# Patient Record
Sex: MALE | Race: WHITE | Employment: FULL TIME | URBAN - METROPOLITAN AREA
[De-identification: names, ages, dates, MRNs, and addresses within clinical notes are randomized per-mention and may not be internally consistent; named-entity substitution may affect disease eponyms.]

---

## 2017-01-11 ENCOUNTER — OFFICE VISIT (OUTPATIENT)
Dept: FAMILY MEDICINE | Facility: CLINIC | Age: 37
End: 2017-01-11
Payer: COMMERCIAL

## 2017-01-11 VITALS
DIASTOLIC BLOOD PRESSURE: 70 MMHG | SYSTOLIC BLOOD PRESSURE: 120 MMHG | WEIGHT: 218.1 LBS | TEMPERATURE: 98.4 F | HEIGHT: 74 IN | BODY MASS INDEX: 27.99 KG/M2 | OXYGEN SATURATION: 98 % | HEART RATE: 98 BPM

## 2017-01-11 DIAGNOSIS — L30.9 ECZEMA, UNSPECIFIED TYPE: Primary | ICD-10-CM

## 2017-01-11 PROCEDURE — 99213 OFFICE O/P EST LOW 20 MIN: CPT | Performed by: NURSE PRACTITIONER

## 2017-01-11 NOTE — PROGRESS NOTES
"  SUBJECTIVE:                                                    Kvng Peace is a 36 year old male who presents to clinic today for the following health issues:      Rash      Duration: 6 months+    Description  Location: hands and elbows, neck wrist left calf  Itching: moderate    Intensity:  moderate    Accompanying signs and symptoms: joint pain in wrists    History (similar episodes/previous evaluation): None    Precipitating or alleviating factors:  New exposures:  None  Recent travel: no      Therapies tried and outcome: topical steroid - kenalog    Chucky is here with complaints of an ongoing erythematous rash on his wrists, elbows,, knees and lower legs. He has been given triamcinolone cream in the past which was not helpful. Patient has patches of shiny red plaque most noticeable on both elbows. Patient is also having bilateral wrist pain and is concerned about psoriatic arthritis. He has seen several specialists with no improvement in the rash        Problem list and histories reviewed & adjusted, as indicated.  Additional history: none    Problem list, Medication list, Allergies, and Medical/Social/Surgical histories reviewed in EPIC and updated as appropriate.    ROS:  Constitutional, HEENT, cardiovascular, pulmonary, gi and gu systems are negative, except as otherwise noted.    OBJECTIVE:                                                    /70 mmHg  Pulse 98  Temp(Src) 98.4  F (36.9  C) (Oral)  Ht 6' 2\" (1.88 m)  Wt 218 lb 1.6 oz (98.93 kg)  BMI 27.99 kg/m2  SpO2 98%  Body mass index is 27.99 kg/(m^2).  GENERAL: healthy, alert and no distress  SKIN: erythematous rash on left lower leg, right wrist and hand and bilateral elbows with shiny placques present. Pruritic. Not responding to topical steroids.          ASSESSMENT/PLAN:                                                            1. Eczema, unspecified type  Will refer to dermatology at this point .since topical creams have not been " effective. Encourage patient to keep moisturizing the area with unscented creams. I have explained that dermatology will make all future recommendations.  - DERMATOLOGY REFERRAL    Follow up as needed.     Emma Stuart NP  [facility]

## 2017-01-11 NOTE — NURSING NOTE
"Chief Complaint   Patient presents with     Derm Problem       Initial /70 mmHg  Pulse 98  Temp(Src) 98.4  F (36.9  C) (Oral)  Ht 6' 2\" (1.88 m)  Wt 218 lb 1.6 oz (98.93 kg)  BMI 27.99 kg/m2  SpO2 98% Estimated body mass index is 27.99 kg/(m^2) as calculated from the following:    Height as of this encounter: 6' 2\" (1.88 m).    Weight as of this encounter: 218 lb 1.6 oz (98.93 kg).  BP completed using cuff size: large right arm   MINERVA Kat      "

## 2017-01-11 NOTE — MR AVS SNAPSHOT
After Visit Summary   1/11/2017    Kvng Peace    MRN: [id number]           Patient Information     Date Of Birth          1980        Visit Information        Provider Department      1/11/2017 9:00 AM Emma Stuart NP [facility]        Today's Diagnoses     Eczema, unspecified type    -  1        Follow-ups after your visit        Additional Services     DERMATOLOGY REFERRAL       [place] [facility] [facility] [place] [phone]   [email or web address]    Please be aware that coverage of these services is subject to the terms and limitations of your health insurance plan.  Call member services at your health plan with any benefit or coverage questions.      Please bring the following with you to your appointment:    (1) Any X-Rays, CTs or MRIs which have been performed.  Contact the facility where they were done to arrange for  prior to your scheduled appointment.    (2) List of current medications  (3) This referral request   (4) Any documents/labs given to you for this referral                  Who to contact     If you have questions or need follow up information about today's clinic visit or your schedule please contact [facility] directly at [phone].  Normal or non-critical lab and imaging results will be communicated to you by MyChart, letter or phone within 4 business days after the clinic has received the results. If you do not hear from us within 7 days, please contact the clinic through MyChart or phone. If you have a critical or abnormal lab result, we will notify you by phone as soon as possible.  Submit refill requests through Mumart or call your pharmacy and they will forward the refill request to us. Please allow 3 business days for your refill to be completed.          Additional Information About Your Visit        MyChart Information     MyChart  "lets you send messages to your doctor, view your test results, renew your prescriptions, schedule appointments and more. To sign up, go to [email or web address] . Click on \"Log in\" on the left side of the screen, which will take you to the Welcome page. Then click on \"Sign up Now\" on the right side of the page.     You will be asked to enter the access code listed below, as well as some personal information. Please follow the directions to create your username and password.     Your access code is: [email or web address]  Expires: 2017  9:28 AM     Your access code will  in 90 days. If you need help or a new code, please call your [facility] clinic or [phone].        Care EveryWhere ID     This is your Care EveryWhere ID. This could be used by other organizations to access your [facility] medical records  [id number]        Your Vitals Were     Pulse Temperature Height BMI (Body Mass Index) Pulse Oximetry       98 98.4  F (36.9  C) (Oral) 6' 2\" (1.88 m) 27.99 kg/m2 98%        Blood Pressure from Last 3 Encounters:   17 120/70   16 126/84   14 122/78    Weight from Last 3 Encounters:   17 218 lb 1.6 oz (98.93 kg)   16 213 lb (96.616 kg)   14 216 lb (97.977 kg)              We Performed the Following     DERMATOLOGY REFERRAL        Primary Care Provider    None Specified       No primary provider on file.        Thank you!     Thank you for choosing [facility]  for your care. Our goal is always to provide you with excellent care. Hearing back from our patients is one way we can continue to improve our services. Please take a few minutes to complete the written survey that you may receive in the mail after your visit with us. Thank you!             Your Updated Medication List - Protect others around you: Learn how to safely use, store and throw away your medicines at www.disposemymeds.org.      Notice  As of 2017  9:28 AM    You have not been prescribed any " medications.

## 2017-01-18 ENCOUNTER — OFFICE VISIT (OUTPATIENT)
Dept: FAMILY MEDICINE | Facility: CLINIC | Age: 37
End: 2017-01-18
Payer: COMMERCIAL

## 2017-01-18 DIAGNOSIS — L40.9 SCALP PSORIASIS: ICD-10-CM

## 2017-01-18 DIAGNOSIS — L40.0 PLAQUE PSORIASIS: Primary | ICD-10-CM

## 2017-01-18 PROCEDURE — 99214 OFFICE O/P EST MOD 30 MIN: CPT | Performed by: FAMILY MEDICINE

## 2017-01-18 RX ORDER — BETAMETHASONE DIPROPIONATE 0.05 %
OINTMENT (GRAM) TOPICAL 2 TIMES DAILY
Qty: 45 G | Refills: 0 | Status: CANCELLED | OUTPATIENT
Start: 2017-01-18

## 2017-01-18 RX ORDER — DESONIDE 0.5 MG/G
CREAM TOPICAL
Qty: 15 G | Refills: 0 | Status: SHIPPED | OUTPATIENT
Start: 2017-01-18 | End: 2018-04-19

## 2017-01-18 RX ORDER — FLUOCINONIDE TOPICAL SOLUTION USP, 0.05% 0.5 MG/ML
SOLUTION TOPICAL DAILY
Qty: 60 ML | Refills: 3 | Status: SHIPPED | OUTPATIENT
Start: 2017-01-18 | End: 2018-04-19

## 2017-01-18 RX ORDER — BETAMETHASONE DIPROPIONATE 0.5 MG/G
CREAM TOPICAL 2 TIMES DAILY
Qty: 45 G | Refills: 0 | Status: SHIPPED | OUTPATIENT
Start: 2017-01-18 | End: 2017-03-01

## 2017-01-18 NOTE — PATIENT INSTRUCTIONS
"FUTURE APPOINTMENTS  Follow up in 4-6 weeks.  Follow up with your primary care physician regarding joint pain in the wrists.    Please label the steroid tubes with the locations of application.  Consider comparing prices on SWYF or m-spatial     TOPICAL STEROID INSTRUCTIONS - arms, hands, and legs  Betamethasone dipropionate 0.05% cream.    Apply an amount equal to half of a fingertip (0.25 g) to the affected area(s) on the arms, legs and hands, two times per day for 10-14 days.    \"Fingertip Amount\"      Not to be used on face or groin.    Not to be used consecutively for more than 10-14 days.    Topical steroid use is for short-term treatment only. Although you can use this as needed for flare-ups, if after initial treatment, you are using this for prolonged periods of time (i.e. every day of the week), re-check for evaluation of treatment.    Keep in mind to also regularly use moisturizer, as this preventative measure can help maintain your skin's natural moisture barrier.    Wear white, cotton gloves (can purchase at any retail pharmacy) after application of moisturizer and topical steroid nightly at bedtime and wear until the morning.    TOPICAL STEROID INSTRUCTIONS - scalp  Fluocinonide 0.05% solution.    Apply a few drops and rub into affected areas on scalp, at bedtime for 7-10 days.    Not to be used on face or groin.    Not to be used consecutively for more than 7-10 days.    Topical steroid use is for short-term treatment only. Although you can use this as needed for flare-ups, if after initial treatment, you are using this for prolonged periods of time (i.e. every day of the week), re-check for evaluation of treatment.    TOPICAL STEROID INSTRUCTIONS - ears and belly button  Desonide 0.05% cream.    Apply an amount equal to half of a fingertip (0.25 g) to the affected area(s) on the ears and belly button, two times per day for 5-7 days.    \"Fingertip Amount\"      This is a weak strength " steroid, and it can be used on the ears.    Not to be used consecutively for more than 5-7 days.    Topical steroid use is for short-term treatment only. Although you can use this as needed for flare-ups, if after initial treatment, you are using this for prolonged periods of time (i.e. every day of the week), re-check for evaluation of treatment.    Keep in mind to also regularly use moisturizer, as this preventative measure can help maintain your skin's natural moisture barrier.    SHAMPOO INSTRUCTIONS  Consider alternating use of shampoos with different active ingredients every 4 week(s).   Coal Tar shampoos : Neutrogena T/Gel, Denorex, DHS Tar, Ionil-T, Tegrin, X-Seb T, Zetar  Zinc Pyrithione shampoos : Head & Shoulders, Denorex Advance Formula, DHS Zinc, Zincon  Salicylic Acid shampoos : Neutrogena T/Sal, DHS Sal, Ionil, P&S, Sebulex, X-Seb  Selenium Sulfide shampoos : Selsun Blue shampoo  Sulfur shampoos : Sebulex    DRY SKIN INSTRUCTIONS  Routine use of moisturizer is important for healthy, resilient skin.    Twice daily use of a moisturizer such as over-the-counter (OTC) CeraVe moisturizer cream (in the jar) or OTC Cetaphil RestoraDerm moisturizer. These contain ceramides and filaggrin proteins that can help to maintain the body's moisture layer.    Always apply moisturizer after washing, within 3 minutes of drying off for best effect.    Do not overuse soap. Just apply soap on the groin and armpits, unless you have been sweating extensively. Recommended products include OTC unscented Dove sensitive skin or OTC Vanicream cleansing bar.    Good facial cleansers include OTC CeraVe hydrating facial cleanser or OTC Cetaphil daily facial cleanser.    Avoid use of scented products and/or antistatic dryer sheets.    SUPPLEMENTS  Take by mouth a supplement of Vitamin D3 1000 IU/day.  Avoid excessive tanning, but a small amount may be helpful for psoriasis symptoms.

## 2017-01-18 NOTE — MR AVS SNAPSHOT
"              After Visit Summary   1/18/2017    Kvng Peace    MRN: [id number]           Patient Information     Date Of Birth          1980        Visit Information        Provider Department      1/18/2017 12:20 PM Debby Salaazr MD [facility]        Today's Diagnoses     Plaque psoriasis    -  1     Scalp psoriasis           Care Instructions    FUTURE APPOINTMENTS  Follow up in 4-6 weeks.  Follow up with your primary care physician regarding joint pain in the wrists.    Please label the steroid tubes with the locations of application.  Consider comparing prices on my[employer] or [employer]     TOPICAL STEROID INSTRUCTIONS - arms, hands, and legs  Betamethasone dipropionate 0.05% cream.    Apply an amount equal to half of a fingertip (0.25 g) to the affected area(s) on the arms, legs and hands, two times per day for 10-14 days.    \"Fingertip Amount\"      Not to be used on face or groin.    Not to be used consecutively for more than 10-14 days.    Topical steroid use is for short-term treatment only. Although you can use this as needed for flare-ups, if after initial treatment, you are using this for prolonged periods of time (i.e. every day of the week), re-check for evaluation of treatment.    Keep in mind to also regularly use moisturizer, as this preventative measure can help maintain your skin's natural moisture barrier.    Wear white, cotton gloves (can purchase at any retail pharmacy) after application of moisturizer and topical steroid nightly at bedtime and wear until the morning.    TOPICAL STEROID INSTRUCTIONS - scalp  Fluocinonide 0.05% solution.    Apply a few drops and rub into affected areas on scalp, at bedtime for 7-10 days.    Not to be used on face or groin.    Not to be used consecutively for more than 7-10 days.    Topical steroid use is for short-term treatment only. Although you can use this as needed for flare-ups, if after initial " "treatment, you are using this for prolonged periods of time (i.e. every day of the week), re-check for evaluation of treatment.    TOPICAL STEROID INSTRUCTIONS - ears  Desonide 0.05% cream.    Apply an amount equal to half of a fingertip (0.25 g) to the affected area(s) on the ears, two times per day for 5-7 days.    \"Fingertip Amount\"      This is a weak strength steroid, and it can be used on the ears.    Not to be used consecutively for more than 5-7 days.    Topical steroid use is for short-term treatment only. Although you can use this as needed for flare-ups, if after initial treatment, you are using this for prolonged periods of time (i.e. every day of the week), re-check for evaluation of treatment.    Keep in mind to also regularly use moisturizer, as this preventative measure can help maintain your skin's natural moisture barrier.    SHAMPOO INSTRUCTIONS  Consider alternating use of shampoos with different active ingredients every 4 week(s).   Coal Tar shampoos : Neutrogena T/Gel, Denorex, DHS Tar, Ionil-T, Tegrin, X-Seb T, Zetar  Zinc Pyrithione shampoos : Head & Shoulders, Denorex Advance Formula, DHS Zinc, Zincon  Salicylic Acid shampoos : Neutrogena T/Sal, DHS Sal, Ionil, P&S, Sebulex, X-Seb  Selenium Sulfide shampoos : Selsun Blue shampoo  Sulfur shampoos : Sebulex    DRY SKIN INSTRUCTIONS  Routine use of moisturizer is important for healthy, resilient skin.    Twice daily use of a moisturizer such as over-the-counter (OTC) CeraVe moisturizer cream (in the jar) or OTC Cetaphil RestoraDerm moisturizer. These contain ceramides and filaggrin proteins that can help to maintain the body's moisture layer.    Always apply moisturizer after washing, within 3 minutes of drying off for best effect.    Do not overuse soap. Just apply soap on the groin and armpits, unless you have been sweating extensively. Recommended products include OTC unscented Dove sensitive skin or OTC Vanicream cleansing bar.    Good " "facial cleansers include OTC CeraVe hydrating facial cleanser or OTC Cetaphil daily facial cleanser.    Avoid use of scented products and/or antistatic dryer sheets.    SUPPLEMENTS  Take by mouth a supplement of Vitamin D3 1000 IU/day.  Avoid excessive tanning, but a small amount may be helpful for psoriasis symptoms.        Follow-ups after your visit        Who to contact     If you have questions or need follow up information about today's clinic visit or your schedule please contact [facility] directly at [phone].  Normal or non-critical lab and imaging results will be communicated to you by HuoBihart, letter or phone within 4 business days after the clinic has received the results. If you do not hear from us within 7 days, please contact the clinic through [email or web address] or phone. If you have a critical or abnormal lab result, we will notify you by phone as soon as possible.  Submit refill requests through Gaatu or call your pharmacy and they will forward the refill request to us. Please allow 3 business days for your refill to be completed.          Additional Information About Your Visit        HuoBiharSharewire Information     Gaatu lets you send messages to your doctor, view your test results, renew your prescriptions, schedule appointments and more. To sign up, go to [email or web address] . Click on \"Log in\" on the left side of the screen, which will take you to the Welcome page. Then click on \"Sign up Now\" on the right side of the page.     You will be asked to enter the access code listed below, as well as some personal information. Please follow the directions to create your username and password.     Your access code is: [email or web address]  Expires: 2017  9:28 AM     Your access code will  in 90 days. If you need help or a new code, please call your [facility] or [phone].        Care EveryWhere ID     This is your Care EveryWhere ID. This could be used by other " organizations to access your [facility] medical records  [id number]         Blood Pressure from Last 3 Encounters:   01/11/17 120/70   04/11/16 126/84   12/23/14 122/78    Weight from Last 3 Encounters:   01/11/17 218 lb 1.6 oz (98.93 kg)   04/11/16 213 lb (96.616 kg)   12/23/14 216 lb (97.977 kg)              Today, you had the following     No orders found for display       Primary Care Provider    None Specified       No primary provider on file.        Thank you!     Thank you for choosing [facility]  for your care. Our goal is always to provide you with excellent care. Hearing back from our patients is one way we can continue to improve our services. Please take a few minutes to complete the written survey that you may receive in the mail after your visit with us. Thank you!             Your Updated Medication List - Protect others around you: Learn how to safely use, store and throw away your medicines at www.disposemymeds.org.      Notice  As of 1/18/2017 12:24 PM    You have not been prescribed any medications.

## 2017-01-18 NOTE — PROGRESS NOTES
[facility] - PRIMARY CARE SKIN    CC : Rash   SUBJECTIVE:                                                    Kvng Peace is a 36 year old male who presents to clinic today because of a(n) itchy rash beginning 1-2 years ago on the hands, elbows, left lower leg. A flare-up on the right leg was noted in April 2016 while in [place], alleviated with use a prescription topical medication . He has also noticed intense itchiness and dandruff on the scalp (first beginning at least 2 years ago); itchiness on the neck was the first noticeable symptom. A rash  is also noted on the belly button.    A pain in the left wrist has been chronic over the past 5 years; pain on the right wrist is new in the past year. He is concerned about psoriasis arthritis. He also golfs frequently (up to a couple times a week). No other joint pains reported.     Pruritic : extremely itchy. Itchiness worst in the morning.  Symptoms appear to be : worsening.  Aggravating factors : heat, shower.  Relieving factors : moisturizer.    Previous history of a similar rash : YES.  Recent exposure history : none known     Therapies tried for rash : Rx topical antibiotic and topical steroid.  Products used : Aveeno, Jergens lotions.    Personal Medical History  Skin Cancer : NO  Eczema Psoriasis Rosacea Autoimmune Other   ?YES ?YES NO NO NO     Family Medical History  Skin Cancer : NO  Eczema Psoriasis Rosacea Autoimmune Other   NO NO NO NO NO     Occupation :  (indoor).    Patient Active Problem List   Diagnosis     CARDIOVASCULAR SCREENING; LDL GOAL LESS THAN 160       Past Medical History   Diagnosis Date     CARDIOVASCULAR SCREENING; LDL GOAL LESS THAN 160 12/23/2014    Past Surgical History   Procedure Laterality Date     [place] teeth        Social History   Substance Use Topics     Smoking status: Former Smoker -- 1.00 packs/day for 15 years     Types: Cigarettes     Smokeless tobacco: Never Used     Alcohol Use: 0.0 oz/week     0  Standard drinks or equivalent per week      Comment: socially    Family History     Problem (# of Occurrences) Relation (Name,Age of Onset)    CANCER (2) Father: kidney: 64: , Maternal Grandfather: lung    DIABETES (3) Mother: T2, Paternal Grandmother, Paternal Grandfather           Prescription Medications as of 2017             betamethasone dipropionate (DIPROSONE) 0.05 % cream Apply topically 2 times daily to affected areas on arms, hands, legs for 10-14 consecutive days, Not to be used on face or groin    fluocinonide (LIDEX) 0.05 % solution Apply topically daily    desonide (DESOWEN) 0.05 % cream Apply sparingly to affected area on ears bid when needed          No Known Allergies     INTEGUMENTARY/SKIN: POSITIVE for pruritis and rash  MUSCULOSKELETAL: POSITIVE for joint aches.  ROS : 14 point review of systems was negative except the symptoms listed above in the HPI.    This document serves as a record of the services and decisions personally performed and made by Jennifer Salazar MD. It was created on her behalf by Dexter Mccall, a trained medical scribe.  The creation of this document is based on the scribe's personal observations and the provider's statements to the medical scribe.  Dexter Mccall, 2017 12:06 PM      OBJECTIVE:                                                    GENERAL: healthy, alert and no distress  SKIN: Matos Skin Type - II.  Face, Neck, Arms, Legs Hands and Fingers were examined. The dermatoscope was used to help evaluate pigmented lesions.  Skin Pertinent Findings:  Left lateral lower leg : 6 cm x 4 cm in size, erythematous, lightly scaly plaque.    Elbows : Erythematous, lightly scaly plaques.    Right Hand : Scaling and erythema on the thenar eminence. 1 cm scaly erythematous plaque between right thumb and index finger.    Left hand : Small erythematous scaly patch on thenar eminence.    Occipital scalp : Dull erythematous scaly patch.    Left ear : Erythematous  "scaly patch     Umbilicus : Erythematous patch.    Back, chest : Clear.    Knees : Clear.    Diagnostic Test Results:  none     MDM : the appearance and distribution of the rash is consistent with psoriasis. At this time I don't think is wrist symptoms are related to psoriasis arthritis but did recommend further evaluation of these symptoms with his primary provider. The wrist symptoms are usually in the summer when he plays golf twice per week.    Discussed spectrum of psoriasis, treatment options etc.  ASSESSMENT:                                                      Encounter Diagnoses   Name Primary?     Plaque psoriasis Yes     Scalp psoriasis          PLAN:                                                    Patient Instructions   FUTURE APPOINTMENTS  Follow up in 4-6 weeks.  Follow up with your primary care physician regarding joint pain in the wrists.    Please label the steroid tubes with the locations of application.  Consider comparing prices on [employer] or [employer]     TOPICAL STEROID INSTRUCTIONS - arms, hands, and legs  Betamethasone dipropionate 0.05% cream.    Apply an amount equal to half of a fingertip (0.25 g) to the affected area(s) on the arms, legs and hands, two times per day for 10-14 days.    \"Fingertip Amount\"      Not to be used on face or groin.    Not to be used consecutively for more than 10-14 days.    Topical steroid use is for short-term treatment only. Although you can use this as needed for flare-ups, if after initial treatment, you are using this for prolonged periods of time (i.e. every day of the week), re-check for evaluation of treatment.    Keep in mind to also regularly use moisturizer, as this preventative measure can help maintain your skin's natural moisture barrier.    Wear white, cotton gloves (can purchase at any retail pharmacy) after application of moisturizer and topical steroid nightly at bedtime and wear until the morning.    TOPICAL STEROID " "INSTRUCTIONS - scalp  Fluocinonide 0.05% solution.    Apply a few drops and rub into affected areas on scalp, at bedtime for 7-10 days.    Not to be used on face or groin.    Not to be used consecutively for more than 7-10 days.    Topical steroid use is for short-term treatment only. Although you can use this as needed for flare-ups, if after initial treatment, you are using this for prolonged periods of time (i.e. every day of the week), re-check for evaluation of treatment.    TOPICAL STEROID INSTRUCTIONS - ears and belly button  Desonide 0.05% cream.    Apply an amount equal to half of a fingertip (0.25 g) to the affected area(s) on the ears and belly button, two times per day for 5-7 days.    \"Fingertip Amount\"      This is a weak strength steroid, and it can be used on the ears.    Not to be used consecutively for more than 5-7 days.    Topical steroid use is for short-term treatment only. Although you can use this as needed for flare-ups, if after initial treatment, you are using this for prolonged periods of time (i.e. every day of the week), re-check for evaluation of treatment.    Keep in mind to also regularly use moisturizer, as this preventative measure can help maintain your skin's natural moisture barrier.    SHAMPOO INSTRUCTIONS  Consider alternating use of shampoos with different active ingredients every 4 week(s).   Coal Tar shampoos : Neutrogena T/Gel, Denorex, DHS Tar, Ionil-T, Tegrin, X-Seb T, Zetar  Zinc Pyrithione shampoos : Head & Shoulders, Denorex Advance Formula, DHS Zinc, Zincon  Salicylic Acid shampoos : Neutrogena T/Sal, DHS Sal, Ionil, P&S, Sebulex, X-Seb  Selenium Sulfide shampoos : Selsun Blue shampoo  Sulfur shampoos : Sebulex    DRY SKIN INSTRUCTIONS  Routine use of moisturizer is important for healthy, resilient skin.    Twice daily use of a moisturizer such as over-the-counter (OTC) CeraVe moisturizer cream (in the jar) or OTC Cetaphil RestoraDerm moisturizer. These contain " ceramides and filaggrin proteins that can help to maintain the body's moisture layer.    Always apply moisturizer after washing, within 3 minutes of drying off for best effect.    Do not overuse soap. Just apply soap on the groin and armpits, unless you have been sweating extensively. Recommended products include OTC unscented Dove sensitive skin or OTC Vanicream cleansing bar.    Good facial cleansers include OTC CeraVe hydrating facial cleanser or OTC Cetaphil daily facial cleanser.    Avoid use of scented products and/or antistatic dryer sheets.    SUPPLEMENTS  Take by mouth a supplement of Vitamin D3 1000 IU/day.  Avoid excessive tanning, but a small amount may be helpful for psoriasis symptoms.        The patient was counseled to use products free of fragrance, dyes, and plants. The importance of using bland cleansers and the regular use of heavy bland emollient creams was impressed upon the patient.      PROCEDURES:                                                    None.    TT : 25 minutes.  CT : 15 minutes.      The information in this document, created by the medical scribe for me, accurately reflects the services I personally performed and the decisions made by me. I have reviewed and approved this document for accuracy prior to leaving the patient care area.  Jennifer Salazar MD January 18, 2017 12:06 PM  [facility] - PRIMARY CARE SKIN

## 2017-03-01 ENCOUNTER — OFFICE VISIT (OUTPATIENT)
Dept: FAMILY MEDICINE | Facility: CLINIC | Age: 37
End: 2017-03-01
Payer: COMMERCIAL

## 2017-03-01 DIAGNOSIS — L40.0 PLAQUE PSORIASIS: Primary | ICD-10-CM

## 2017-03-01 DIAGNOSIS — L40.9 SCALP PSORIASIS: ICD-10-CM

## 2017-03-01 PROCEDURE — 99213 OFFICE O/P EST LOW 20 MIN: CPT | Performed by: FAMILY MEDICINE

## 2017-03-01 RX ORDER — BETAMETHASONE DIPROPIONATE 0.5 MG/G
CREAM TOPICAL
Qty: 45 G | Refills: 0 | Status: SHIPPED | OUTPATIENT
Start: 2017-03-01 | End: 2018-04-19

## 2017-03-01 NOTE — PROGRESS NOTES
[facility] - PRIMARY CARE SKIN    CC : psoriasis  SUBJECTIVE:                                                    Kvng Peace is a 37 year old male who presents to clinic today for follow-up of plaque and scalp psoriasis, beginning 1-2 years ago. Areas of involvement include the scalp, ears, arms, hands, legs, and umbilicus. He had also complained of wrist joint pain, but suspected due to golf.     Itchiness has recurred on the legs two days ago but without presence of visible rash. Itchiness has been diminishing within the last two days.    Current treatment : Lubriderm moisturizer use, Vitamin D supplement not begun.    Scalp : fluocinonide 0.05% solution, anti-dandruff shampoos.    Arms, Hands and Legs : Betamethasone dipropionate 0.05% cream    Ears and umbilicus : desonide 0.05% cream  Response to treatment : Symptoms have begun to resolve. Itchiness diminished greatly. Area on right palm is most bothersome with itchiness in the mornings. He discontinued treatments when he traveled to [place] for 1 week.    For complete history, please review office visit notes from 1/18/2017    Personal Medical History  Skin Cancer : NO  Eczema Psoriasis Rosacea Autoimmune Other   NNO YES NO NO NO     Family Medical History  Skin Cancer : NO  Eczema Psoriasis Rosacea Autoimmune Other   NO NO NO NO NO     Occupation :  (indoor).    Patient Active Problem List   Diagnosis     CARDIOVASCULAR SCREENING; LDL GOAL LESS THAN 160       Past Medical History   Diagnosis Date     CARDIOVASCULAR SCREENING; LDL GOAL LESS THAN 160 12/23/2014    Past Surgical History   Procedure Laterality Date     [place] teeth        Social History   Substance Use Topics     Smoking status: Former Smoker     Packs/day: 1.00     Years: 15.00     Types: Cigarettes     Smokeless tobacco: Never Used     Alcohol use 0.0 oz/week     0 Standard drinks or equivalent per week      Comment: socially    Family History     Problem (# of  Occurrences) Relation (Name,Age of Onset)    CANCER (2) Father: kidney: 64: , Maternal Grandfather: lung    DIABETES (3) Mother: T2, Paternal Grandmother, Paternal Grandfather           Prescription Medications as of 3/1/2017             betamethasone dipropionate (DIPROSONE) 0.05 % cream Apply topically 2 times daily to affected areas on arms, hands, legs for 10-14 consecutive days, Not to be used on face or groin    fluocinonide (LIDEX) 0.05 % solution Apply topically daily    desonide (DESOWEN) 0.05 % cream Apply sparingly to affected area on ears bid when needed          No Known Allergies     INTEGUMENTARY/SKIN: POSITIVE for pruritis and rash  ROS : 14 point review of systems was negative except the symptoms listed above in the HPI.    This document serves as a record of the services and decisions personally performed and made by Jennifer Salazar MD. It was created on her behalf by Dexter Mccall, a trained medical scribe.  The creation of this document is based on the scribe's personal observations and the provider's statements to the medical scribe.  Dexter Mccall, 2017 12:11 PM      OBJECTIVE:                                                    GENERAL: healthy, alert and no distress  SKIN: Matos Skin Type - II.  Face, Neck, Arms, Legs Hands and Fingers were examined. The dermatoscope was used to help evaluate pigmented lesions.  Skin Pertinent Findings:  Right hand, palmar surface : Residual lightly scaling plaques over thenar eminence and mid-palm.    Left hand : clear    Occipital scalp : Clear.  Behind left ear : Clear.   Elbows : Clear.    Left lateral lower leg : 6 cm x 4 cm in size, erythematous, lightly scaly plaque.  Umbilicus : Erythematous patch.    Diagnostic Test Results:  none           ASSESSMENT:                                                      Encounter Diagnoses   Name Primary?     Plaque psoriasis Yes     Scalp psoriasis          PLAN:                                                     Patient Instructions   FUTURE APPOINTMENTS  Follow up as needed.    TOPICAL STEROID INSTRUCTIONS  Betamethasone dipropionate 0.05% cream.    Apply an amount equal to half of a fingertip (0.25 g) to the affected area(s) on the right hand, two times per day for 10-14 days.    After 2 weeks, decrease frequency of application to once daily on M-F, pause application on Sat-Sun.    Not to be used on face or groin.      Topical steroid use is for short-term treatment only. If after initial treatment, you are using this for prolonged periods of time, return to clinic for re-evaluation of treatment.    Keep in mind to also regularly use moisturizer, as this preventative measure can help maintain your skin's natural moisture barrier.    Wear white, cotton gloves (can purchase at any retail pharmacy) after application of moisturizer and topical steroid nightly at bedtime and wear until the morning.      Continue using other topical steroids as needed.    DRY SKIN INSTRUCTIONS  Routine use of moisturizer is important for healthy, resilient skin.    Twice daily use of a moisturizer such as over-the-counter (OTC) CeraVe moisturizer cream (in the jar) or OTC Cetaphil RestoraDerm moisturizer. These contain ceramides and filaggrin proteins that can help to maintain the body's moisture layer.    Always apply moisturizer after washing, within 3 minutes of drying off for best effect.    Do not overuse soap. Just apply soap on the groin and armpits, unless you have been sweating extensively. Recommended products include OTC unscented Dove sensitive skin or OTC Vanicream cleansing bar.    Good facial cleansers include OTC CeraVe hydrating facial cleanser or OTC Cetaphil daily facial cleanser.    Avoid use of scented products and/or antistatic dryer sheets.    Always try to wear rubber gloves when washing dishes or cleaning.        PROCEDURES:                                                    None.    TT : 20 minutes.  CT :  15 minutes.      The information in this document, created by the medical scribe for me, accurately reflects the services I personally performed and the decisions made by me. I have reviewed and approved this document for accuracy prior to leaving the patient care area.  Jennifer Salazar MD March 1, 2017 12:10 PM  [facility] - PRIMARY CARE SKIN

## 2017-03-01 NOTE — MR AVS SNAPSHOT
After Visit Summary   3/1/2017    Kvng Peace    MRN: [id number]           Patient Information     Date Of Birth          1980        Visit Information        Provider Department      3/1/2017 12:20 PM Debby Salazar MD [facility] - Primary Care Skin        Today's Diagnoses     Plaque psoriasis    -  1    Scalp psoriasis          Care Instructions    FUTURE APPOINTMENTS  Follow up as needed.    TOPICAL STEROID INSTRUCTIONS  Betamethasone dipropionate 0.05% cream.    Apply an amount equal to half of a fingertip (0.25 g) to the affected area(s) on the right hand, two times per day for 10-14 days.    After 2 weeks, decrease frequency of application to once daily on M-F, pause application on Sat-Sun.    Not to be used on face or groin.      Topical steroid use is for short-term treatment only. If after initial treatment, you are using this for prolonged periods of time, return to clinic for re-evaluation of treatment.    Keep in mind to also regularly use moisturizer, as this preventative measure can help maintain your skin's natural moisture barrier.    Wear white, cotton gloves (can purchase at any retail pharmacy) after application of moisturizer and topical steroid nightly at bedtime and wear until the morning.      Continue using other topical steroids as needed.    DRY SKIN INSTRUCTIONS  Routine use of moisturizer is important for healthy, resilient skin.    Twice daily use of a moisturizer such as over-the-counter (OTC) CeraVe moisturizer cream (in the jar) or OTC Cetaphil RestoraDerm moisturizer. These contain ceramides and filaggrin proteins that can help to maintain the body's moisture layer.    Always apply moisturizer after washing, within 3 minutes of drying off for best effect.    Do not overuse soap. Just apply soap on the groin and armpits, unless you have been sweating extensively. Recommended products include OTC unscented Dove sensitive skin or OTC Vanicream  "cleansing bar.    Good facial cleansers include OTC CeraVe hydrating facial cleanser or OTC Cetaphil daily facial cleanser.    Avoid use of scented products and/or antistatic dryer sheets.    Always try to wear rubber gloves when washing dishes or cleaning.        Follow-ups after your visit        Who to contact     If you have questions or need follow up information about today's clinic visit or your schedule please contact [facility] - PRIMARY CARE SKIN directly at [phone].  Normal or non-critical lab and imaging results will be communicated to you by [employer], letter or phone within 4 business days after the clinic has received the results. If you do not hear from us within 7 days, please contact the clinic through [employer] or phone. If you have a critical or abnormal lab result, we will notify you by phone as soon as possible.  Submit refill requests through [employer] or call your pharmacy and they will forward the refill request to us. Please allow 3 business days for your refill to be completed.          Additional Information About Your Visit        [employer] Information     [employer] lets you send messages to your doctor, view your test results, renew your prescriptions, schedule appointments and more. To sign up, go to [email or web address] . Click on \"Log in\" on the left side of the screen, which will take you to the Welcome page. Then click on \"Sign up Now\" on the right side of the page.     You will be asked to enter the access code listed below, as well as some personal information. Please follow the directions to create your username and password.     Your access code is: [email or web address]  Expires: 2017  9:28 AM     Your access code will  in 90 days. If you need help or a new code, please call your [facility] or [phone].        Care EveryWhere ID     This is your Care EveryWhere ID. This could be used by other organizations to access your [facility] medical records  [id number]   "       Blood Pressure from Last 3 Encounters:   01/11/17 120/70   04/11/16 126/84   12/23/14 122/78    Weight from Last 3 Encounters:   01/11/17 218 lb 1.6 oz (98.9 kg)   04/11/16 213 lb (96.6 kg)   12/23/14 216 lb (98 kg)              Today, you had the following     No orders found for display         Today's Medication Changes          These changes are accurate as of: 3/1/17 12:22 PM.  If you have any questions, ask your nurse or doctor.               These medicines have changed or have updated prescriptions.        Dose/Directions    betamethasone dipropionate 0.05 % cream   Commonly known as:  DIPROSONE   This may have changed:    - how to take this  - when to take this  - additional instructions   Used for:  Plaque psoriasis   Changed by:  Debby Salazar MD        Apply to affected areas on arms, hands, legs for 10-14 consecutive days, Not to be used on face or groin   Quantity:  45 g   Refills:  0            Where to get your medicines      These medications were sent to [id number]    Hours:  Tech issues with their phone system Phone:  [phone]     betamethasone dipropionate 0.05 % cream                Primary Care Provider    None Specified       No primary provider on file.        Thank you!     Thank you for choosing [facility] - PRIMARY CARE SKIN  for your care. Our goal is always to provide you with excellent care. Hearing back from our patients is one way we can continue to improve our services. Please take a few minutes to complete the written survey that you may receive in the mail after your visit with us. Thank you!             Your Updated Medication List - Protect others around you: Learn how to safely use, store and throw away your medicines at www.disposemymeds.org.          This list is accurate as of: 3/1/17 12:22 PM.  Always use your most recent med list.                   Brand  Name Dispense Instructions for use    betamethasone dipropionate 0.05 % cream    DIPROSONE    45 g    Apply to affected areas on arms, hands, legs for 10-14 consecutive days, Not to be used on face or groin       desonide 0.05 % cream    DESOWEN    15 g    Apply sparingly to affected area on ears bid when needed       fluocinonide 0.05 % solution    LIDEX    60 mL    Apply topically daily

## 2017-03-01 NOTE — PATIENT INSTRUCTIONS
FUTURE APPOINTMENTS  Follow up as needed.    TOPICAL STEROID INSTRUCTIONS  Betamethasone dipropionate 0.05% cream.    Apply an amount equal to half of a fingertip (0.25 g) to the affected area(s) on the right hand, two times per day for 10-14 days.    After 2 weeks, decrease frequency of application to once daily on M-F, pause application on Sat-Sun.    Not to be used on face or groin.      Topical steroid use is for short-term treatment only. If after initial treatment, you are using this for prolonged periods of time, return to clinic for re-evaluation of treatment.    Keep in mind to also regularly use moisturizer, as this preventative measure can help maintain your skin's natural moisture barrier.    Wear white, cotton gloves (can purchase at any retail pharmacy) after application of moisturizer and topical steroid nightly at bedtime and wear until the morning.      Continue using other topical steroids as needed.    DRY SKIN INSTRUCTIONS  Routine use of moisturizer is important for healthy, resilient skin.    Twice daily use of a moisturizer such as over-the-counter (OTC) CeraVe moisturizer cream (in the jar) or OTC Cetaphil RestoraDerm moisturizer. These contain ceramides and filaggrin proteins that can help to maintain the body's moisture layer.    Always apply moisturizer after washing, within 3 minutes of drying off for best effect.    Do not overuse soap. Just apply soap on the groin and armpits, unless you have been sweating extensively. Recommended products include OTC unscented Dove sensitive skin or OTC Vanicream cleansing bar.    Good facial cleansers include OTC CeraVe hydrating facial cleanser or OTC Cetaphil daily facial cleanser.    Avoid use of scented products and/or antistatic dryer sheets.    Always try to wear rubber gloves when washing dishes or cleaning.

## 2018-04-19 ENCOUNTER — TELEPHONE (OUTPATIENT)
Dept: FAMILY MEDICINE | Facility: CLINIC | Age: 38
End: 2018-04-19

## 2018-04-19 ENCOUNTER — OFFICE VISIT (OUTPATIENT)
Dept: FAMILY MEDICINE | Facility: CLINIC | Age: 38
End: 2018-04-19
Payer: COMMERCIAL

## 2018-04-19 VITALS
HEART RATE: 75 BPM | DIASTOLIC BLOOD PRESSURE: 71 MMHG | RESPIRATION RATE: 16 BRPM | BODY MASS INDEX: 30.42 KG/M2 | WEIGHT: 237 LBS | SYSTOLIC BLOOD PRESSURE: 122 MMHG | HEIGHT: 74 IN | TEMPERATURE: 99 F

## 2018-04-19 DIAGNOSIS — M10.072 ACUTE IDIOPATHIC GOUT OF LEFT FOOT: Primary | ICD-10-CM

## 2018-04-19 PROCEDURE — 99214 OFFICE O/P EST MOD 30 MIN: CPT | Performed by: FAMILY MEDICINE

## 2018-04-19 PROCEDURE — 84550 ASSAY OF BLOOD/URIC ACID: CPT | Performed by: FAMILY MEDICINE

## 2018-04-19 PROCEDURE — 36415 COLL VENOUS BLD VENIPUNCTURE: CPT | Performed by: FAMILY MEDICINE

## 2018-04-19 PROCEDURE — 80053 COMPREHEN METABOLIC PANEL: CPT | Performed by: FAMILY MEDICINE

## 2018-04-19 RX ORDER — OMEPRAZOLE 10 MG/1
CAPSULE, DELAYED RELEASE ORAL
Qty: 60 CAPSULE | COMMUNITY
Start: 2018-04-19 | End: 2022-06-17

## 2018-04-19 RX ORDER — PREDNISONE 20 MG/1
TABLET ORAL
Qty: 25 TABLET | Refills: 0 | Status: SHIPPED | OUTPATIENT
Start: 2018-04-19 | End: 2018-05-04

## 2018-04-19 NOTE — TELEPHONE ENCOUNTER
[facility] calling- Prednisone states 4 tabs (40 mg) for first part of directions.  Is it 2 tabs (40 mg) or 4 tabs (80 mg)?  Please advise.  Can send new RX or I can call them.  Rivka Seth RN    predniSONE (DELTASONE) 20 MG tablet 25 tablet 0 4/19/2018  --      Sig: Take 4 tabs (40 mg) by mouth daily x 4 days, 1 tabs (20 mg) daily x 4 days, then 1/2 tab (10 mg) x 2 days.

## 2018-04-19 NOTE — MR AVS SNAPSHOT
After Visit Summary   4/19/2018    Kvng Peace    MRN: [id number]           Patient Information     Date Of Birth          1980        Visit Information        Provider Department      4/19/2018 8:45 AM Kaylan Coats MD [facility]        Today's Diagnoses     Acute idiopathic gout of left foot    -  1      Care Instructions    Follow up if no improvement     Treating Gout Attacks     Raising the joint above the level of your heart can help reduce gout symptoms.     Gout is a disease that affects the joints. It is caused by excess uric acid in your blood stream that may lead to crystals forming in your joints. Left untreated, it can lead to painful foot and joint deformities and even kidney problems. But, by treating gout early, you can relieve pain and help prevent future problems. Gout can usually be treated with medication and proper diet. In severe cases, surgery may be needed.  Gout attacks are painful and often happen more than once. Taking medications may reduce pain and prevent attacks in the future. There are also some things you can do at home to relieve symptoms.  Medications for gout  Your healthcare provider may prescribe a daily medication to reduce levels of uric acid. Reducing your uric acid levels may help prevent gout attacks. Allopurinol is one commonly used medication taken daily to reduce uric acid levels. Other medications can help relieve pain and swelling during an acute attack. Medicines such as NSAIDs (nonsteroidal anti-inflammatory medicines), steroids, and colchicine may be prescribed for intermittent use to relieve an acute gout attack. Be sure to take your medication as directed.  What you can do  Below are some things you can do at home to relieve gout symptoms. Your healthcare provider may have other tips.    Rest the painful joint as much as you can.    Raise the painful joint so it is at a level higher than your heart.    Use  ice for 10 minutes every 1-2 hours as possible.  How can I prevent gout?  With a little effort, you may be able to prevent gout attacks in the future. Here are some things you can do:    Avoid foods high in purines  ? Certain meats (red meat, processed meat, turkey)  ? Organ meats (kidney, liver, sweetbread)  ? Shellfish (lobster, crab, shrimp, scallop, mussel)  ? Certain fish (anchovy, sardine, herring, mackerel)    Take any medications prescribed by your healthcare provider.    Lose weight if you need to.    Reduce high fructose corn syrup in meals and drinks.    Reduce or eliminate consumption of alcohol, particularly beer, but also red wine and spirits.    Control blood pressure, diabetes, and cholesterol.    Drink plenty of water to help flush uric acid from your body.  Date Last Reviewed: 2/1/2016 2000-2017 [employer]. [address] [place] [address]. All rights reserved. This information is not intended as a substitute for professional medical care. Always follow your healthcare professional's instructions.                Follow-ups after your visit        Who to contact     If you have questions or need follow up information about today's clinic visit or your schedule please contact [facility] directly at [phone].  Normal or non-critical lab and imaging results will be communicated to you by Olocityhart, letter or phone within 4 business days after the clinic has received the results. If you do not hear from us within 7 days, please contact the clinic through [employer] or phone. If you have a critical or abnormal lab result, we will notify you by phone as soon as possible.  Submit refill requests through Aternity or call your pharmacy and they will forward the refill request to us. Please allow 3 business days for your refill to be completed.          Additional Information About Your Visit        Aternity Information     Aternity lets you send messages to your  "doctor, view your test results, renew your prescriptions, schedule appointments and more. To sign up, go to [email or web address] . Click on \"Log in\" on the left side of the screen, which will take you to the Welcome page. Then click on \"Sign up Now\" on the right side of the page.     You will be asked to enter the access code listed below, as well as some personal information. Please follow the directions to create your username and password.     Your access code is: [email or web address]  Expires: 2018  9:14 AM     Your access code will  in 90 days. If you need help or a new code, please call your [facility] clinic or [phone].        Care EveryWhere ID     This is your Care EveryWhere ID. This could be used by other organizations to access your [place] [facility] medical records  [id number]        Your Vitals Were     Pulse Temperature Respirations Height BMI (Body Mass Index)       75 99  F (37.2  C) (Oral) 16 6' 2\" (1.88 m) 30.43 kg/m2        Blood Pressure from Last 3 Encounters:   18 122/71   17 120/70   16 126/84    Weight from Last 3 Encounters:   18 237 lb (107.5 kg)   17 218 lb 1.6 oz (98.9 kg)   16 213 lb (96.6 kg)              We Performed the Following     Comprehensive metabolic panel     Uric acid          Today's Medication Changes          These changes are accurate as of 18  9:14 AM.  If you have any questions, ask your nurse or doctor.               Start taking these medicines.        Dose/Directions    predniSONE 20 MG tablet   Commonly known as:  DELTASONE   Used for:  Acute idiopathic gout of left foot   Started by:  Kaylan Coats MD        Take 4 tabs (40 mg) by mouth daily x 4 days, 1 tabs (20 mg) daily x 4 days, then 1/2 tab (10 mg) x 2 days.   Quantity:  25 tablet   Refills:  0         Stop taking these medicines if you haven't already. Please contact your care team if you have questions.     betamethasone dipropionate 0.05 % cream "   Commonly known as:  DIPROSONE   Stopped by:  Kaylan Coats MD           desonide 0.05 % cream   Commonly known as:  DESOWEN   Stopped by:  Kaylan Coats MD           fluocinonide 0.05 % solution   Commonly known as:  LIDEX   Stopped by:  Kaylan Coats MD                Where to get your medicines      These medications were sent to [place] [address] [place] [facility] [address] [place] [id number] [facility] [address]    Hours:  Tech issues with their phone system Phone:  [phone]     predniSONE 20 MG tablet                Primary Care Provider Fax #    Physician No Ref-Primary [phone]       No address on file        Equal Access to Services     BONIFACIO MUSA : Hadii aad ku hadasho Somaggieali, waaxda luqadaha, qaybta kaalmada adeegyada, ritchie rubio . So [facility] [phone].    ATENCIÓN: Si habla español, tiene a lovell disposición servicios gratuitos de asistencia lingüística. [facility] [phone].    We comply with applicable federal civil rights laws and [place] laws. We do not discriminate on the basis of race, color, national origin, age, disability, sex, sexual orientation, or gender identity.            Thank you!     Thank you for choosing [facility]  for your care. Our goal is always to provide you with excellent care. Hearing back from our patients is one way we can continue to improve our services. Please take a few minutes to complete the written survey that you may receive in the mail after your visit with us. Thank you!             Your Updated Medication List - Protect others around you: Learn how to safely use, store and throw away your medicines at www.disposemymeds.org.          This list is accurate as of 4/19/18  9:14 AM.  Always use your most recent med list.                   Brand Name Dispense Instructions for use Diagnosis    omeprazole 10 MG CR capsule    priLOSEC    60 capsule     Take by mouth 30-60 minutes before a meal.        predniSONE 20 MG tablet    DELTASONE    25 tablet    Take 4 tabs (40 mg) by mouth daily x 4 days, 1 tabs (20 mg) daily x 4 days, then 1/2 tab (10 mg) x 2 days.    Acute idiopathic gout of left foot

## 2018-04-19 NOTE — PROGRESS NOTES
"  SUBJECTIVE:   Kvng Peace is a 38 year old male who presents to clinic today for the following health issues:      Joint Pain    Onset: since 2018    Description:   Location: left great toe  Character: Sharp    Intensity: moderate    Progression of Symptoms: intermittent    Accompanying Signs & Symptoms:  Other symptoms: radiation of pain to left ankle    History:   Previous similar pain: no       Precipitating factors:   Trauma or overuse: no     Alleviating factors:  Improved by: nothing    Therapies Tried and outcome: ibuprofen    Pain started Friday night right after dinner- had wine with dinner.    Ibuprofen with some relief.   No trauma to foot.   Admits to drinking beer and wine often.   Positive FHx of gout- father and brother         Problem list and histories reviewed & adjusted, as indicated.  Additional history: as documented    Patient Active Problem List   Diagnosis     CARDIOVASCULAR SCREENING; LDL GOAL LESS THAN 160     Past Surgical History:   Procedure Laterality Date     wisdom teeth         Social History   Substance Use Topics     Smoking status: Former Smoker     Packs/day: 1.00     Years: 15.00     Types: Cigarettes     Smokeless tobacco: Never Used     Alcohol use 0.0 oz/week     0 Standard drinks or equivalent per week      Comment: socially     Family History   Problem Relation Age of Onset     DIABETES Mother      T2     CANCER Father      kidney: 64:      CANCER Maternal Grandfather      lung     DIABETES Paternal Grandmother      DIABETES Paternal Grandfather            Reviewed and updated as needed this visit by clinical staff  Tobacco  Allergies       Reviewed and updated as needed this visit by Provider         ROS:  Constitutional, MSK, skin  systems are negative, except as otherwise noted.    OBJECTIVE:     /71 (BP Location: Left arm, Patient Position: Chair, Cuff Size: Adult Large)  Pulse 75  Temp 99  F (37.2  C) (Oral)  Resp 16  Ht 6' 2\" (1.88 m)  " Wt 237 lb (107.5 kg)  BMI 30.43 kg/m2  Body mass index is 30.43 kg/(m^2).  GENERAL: healthy, alert and no distress  MS: left foot- swelling 1st MTP joint, TTP, antalgic gait   SKIN: erythema and warmth left foot     Diagnostic Test Results:  none     ASSESSMENT/PLAN:         1. Acute idiopathic gout of left foot  - new  - will start on prednisone. Diet changes also stressed.   - Uric acid  - Comprehensive metabolic panel  - predniSONE (DELTASONE) 20 MG tablet; Take 2 tabs (40 mg) by mouth daily x 4 days, 1 tabs (20 mg) daily x 4 days, then 1/2 tab (10 mg) x 2 days.  Dispense: 25 tablet; Refill: 0    See Patient Instructions    Kaylan Coats MD  [facility]

## 2018-04-19 NOTE — TELEPHONE ENCOUNTER
Per Dr. Coats- should be 2 tabs (40 mg).  Called [facility] and spoke to pharmacist and advised on directions.  Rivka Seth RN

## 2018-04-19 NOTE — PATIENT INSTRUCTIONS
Follow up if no improvement     Treating Gout Attacks     Raising the joint above the level of your heart can help reduce gout symptoms.     Gout is a disease that affects the joints. It is caused by excess uric acid in your blood stream that may lead to crystals forming in your joints. Left untreated, it can lead to painful foot and joint deformities and even kidney problems. But, by treating gout early, you can relieve pain and help prevent future problems. Gout can usually be treated with medication and proper diet. In severe cases, surgery may be needed.  Gout attacks are painful and often happen more than once. Taking medications may reduce pain and prevent attacks in the future. There are also some things you can do at home to relieve symptoms.  Medications for gout  Your healthcare provider may prescribe a daily medication to reduce levels of uric acid. Reducing your uric acid levels may help prevent gout attacks. Allopurinol is one commonly used medication taken daily to reduce uric acid levels. Other medications can help relieve pain and swelling during an acute attack. Medicines such as NSAIDs (nonsteroidal anti-inflammatory medicines), steroids, and colchicine may be prescribed for intermittent use to relieve an acute gout attack. Be sure to take your medication as directed.  What you can do  Below are some things you can do at home to relieve gout symptoms. Your healthcare provider may have other tips.    Rest the painful joint as much as you can.    Raise the painful joint so it is at a level higher than your heart.    Use ice for 10 minutes every 1-2 hours as possible.  How can I prevent gout?  With a little effort, you may be able to prevent gout attacks in the future. Here are some things you can do:    Avoid foods high in purines  ? Certain meats (red meat, processed meat, turkey)  ? Organ meats (kidney, liver, sweetbread)  ? Shellfish (lobster, crab, shrimp, scallop, mussel)  ? Certain fish  (anchovy, sardine, herring, mackerel)    Take any medications prescribed by your healthcare provider.    Lose weight if you need to.    Reduce high fructose corn syrup in meals and drinks.    Reduce or eliminate consumption of alcohol, particularly beer, but also red wine and spirits.    Control blood pressure, diabetes, and cholesterol.    Drink plenty of water to help flush uric acid from your body.  Date Last Reviewed: 2/1/2016 2000-2017 [employer]. [address] [place] [address], [place] [id number]. All rights reserved. This information is not intended as a substitute for professional medical care. Always follow your healthcare professional's instructions.

## 2018-04-20 LAB
ALBUMIN SERPL-MCNC: 4.1 G/DL (ref 3.4–5)
ALP SERPL-CCNC: 75 U/L (ref 40–150)
ALT SERPL W P-5'-P-CCNC: 20 U/L (ref 0–70)
ANION GAP SERPL CALCULATED.3IONS-SCNC: 6 MMOL/L (ref 3–14)
AST SERPL W P-5'-P-CCNC: 14 U/L (ref 0–45)
BILIRUB SERPL-MCNC: 0.8 MG/DL (ref 0.2–1.3)
BUN SERPL-MCNC: 13 MG/DL (ref 7–30)
CALCIUM SERPL-MCNC: 9.4 MG/DL (ref 8.5–10.1)
CHLORIDE SERPL-SCNC: 110 MMOL/L (ref 94–109)
CO2 SERPL-SCNC: 27 MMOL/L (ref 20–32)
CREAT SERPL-MCNC: 1.01 MG/DL (ref 0.66–1.25)
GFR SERPL CREATININE-BSD FRML MDRD: 82 ML/MIN/1.7M2
GLUCOSE SERPL-MCNC: 88 MG/DL (ref 70–99)
POTASSIUM SERPL-SCNC: 4.6 MMOL/L (ref 3.4–5.3)
PROT SERPL-MCNC: 7.2 G/DL (ref 6.8–8.8)
SODIUM SERPL-SCNC: 143 MMOL/L (ref 133–144)
URATE SERPL-MCNC: 6.5 MG/DL (ref 3.5–7.2)

## 2018-05-04 ENCOUNTER — RADIANT APPOINTMENT (OUTPATIENT)
Dept: GENERAL RADIOLOGY | Facility: CLINIC | Age: 38
End: 2018-05-04
Attending: FAMILY MEDICINE
Payer: COMMERCIAL

## 2018-05-04 ENCOUNTER — OFFICE VISIT (OUTPATIENT)
Dept: FAMILY MEDICINE | Facility: CLINIC | Age: 38
End: 2018-05-04
Payer: COMMERCIAL

## 2018-05-04 VITALS
DIASTOLIC BLOOD PRESSURE: 77 MMHG | WEIGHT: 235 LBS | RESPIRATION RATE: 20 BRPM | BODY MASS INDEX: 30.17 KG/M2 | HEART RATE: 98 BPM | TEMPERATURE: 98.4 F | SYSTOLIC BLOOD PRESSURE: 122 MMHG

## 2018-05-04 DIAGNOSIS — M10.072 ACUTE IDIOPATHIC GOUT OF LEFT FOOT: ICD-10-CM

## 2018-05-04 DIAGNOSIS — M10.072 ACUTE IDIOPATHIC GOUT OF LEFT FOOT: Primary | ICD-10-CM

## 2018-05-04 PROCEDURE — 99214 OFFICE O/P EST MOD 30 MIN: CPT | Performed by: FAMILY MEDICINE

## 2018-05-04 PROCEDURE — 73630 X-RAY EXAM OF FOOT: CPT | Mod: LT

## 2018-05-04 RX ORDER — ALLOPURINOL 100 MG/1
100 TABLET ORAL DAILY
Qty: 30 TABLET | Refills: 1 | Status: SHIPPED | OUTPATIENT
Start: 2018-05-04 | End: 2020-01-10

## 2018-05-04 RX ORDER — INDOMETHACIN 25 MG/1
25 CAPSULE ORAL 2 TIMES DAILY WITH MEALS
Qty: 42 CAPSULE | Refills: 1 | Status: SHIPPED | OUTPATIENT
Start: 2018-05-04 | End: 2020-01-10

## 2018-05-04 NOTE — PROGRESS NOTES
SUBJECTIVE:   Kvng Peace is a 38 year old male who presents to clinic today for the following health issues:      F/u left foot pain, no improvement    Patient here today for follow up of left foot gout.   He recently completed course of prednisone and states he felt somewhat better until he went following a couple of days ago. He has tried to change his diet at best as possible. notes swelling and redness is improved.   Per patient, both his father and brother have gout.       Problem list and histories reviewed & adjusted, as indicated.  Additional history: as documented    Patient Active Problem List   Diagnosis     CARDIOVASCULAR SCREENING; LDL GOAL LESS THAN 160     Acute idiopathic gout of left foot     Past Surgical History:   Procedure Laterality Date     wisdom teeth         Social History   Substance Use Topics     Smoking status: Former Smoker     Packs/day: 1.00     Years: 15.00     Types: Cigarettes     Smokeless tobacco: Never Used     Alcohol use 0.0 oz/week     0 Standard drinks or equivalent per week      Comment: socially     Family History   Problem Relation Age of Onset     DIABETES Mother      T2     CANCER Father      kidney: 64:      CANCER Maternal Grandfather      lung     DIABETES Paternal Grandmother      DIABETES Paternal Grandfather            Reviewed and updated as needed this visit by clinical staff  Tobacco  Allergies  Meds       Reviewed and updated as needed this visit by Provider         ROS:  Constitutional, msk, skin systems are negative, except as otherwise noted.    OBJECTIVE:     /77 (BP Location: Left arm, Patient Position: Chair, Cuff Size: Adult Large)  Pulse 98  Temp 98.4  F (36.9  C) (Oral)  Resp 20  Wt 235 lb (106.6 kg)  BMI 30.17 kg/m2  Body mass index is 30.17 kg/(m^2).  GENERAL: healthy, alert and no distress  MS: left foot- TTP- 1st MTP no swelling, warmth or treatment     Diagnostic Test Results:  XR left foot- I independently  visualized the xray: no obvious fracture noted     ASSESSMENT/PLAN:       1. Acute idiopathic gout of left foot  - no swelling, erythema or warmth of affected area as compared to last visit.Clinical diagnosis rule score - 8.5 pts indicating high probability of gout.   - will start on indomethacin. Xray unremarkable. If persists will refer to ortho for tap.   - XR Foot Left G/E 3 Views; Future  - indomethacin (INDOCIN) 25 MG capsule; Take 1 capsule (25 mg) by mouth 2 times daily (with meals)  Dispense: 42 capsule; Refill: 1  - allopurinol (ZYLOPRIM) 100 MG tablet; Take 1 tablet (100 mg) by mouth daily  Dispense: 30 tablet; Refill: 1    See Patient Instructions    Kaylan Coats MD  [facility]

## 2018-05-04 NOTE — MR AVS SNAPSHOT
After Visit Summary   5/4/2018    Kvng Peace    MRN: [id number]           Patient Information     Date Of Birth          1980        Visit Information        Provider Department      5/4/2018 1:30 PM Kaylan Coats MD [facility]        Today's Diagnoses     Acute idiopathic gout of left foot    -  1      Care Instructions    Follow up in 1 week or sooner   Start indomethacin 2 tablets twice a day till Sunday. On Monday take 1 tablet daily and stop 1 day after pain resolution  Start allopurinol           Follow-ups after your visit        Who to contact     If you have questions or need follow up information about today's clinic visit or your schedule please contact [facility] directly at [phone].  Normal or non-critical lab and imaging results will be communicated to you by Plyfehart, letter or phone within 4 business days after the clinic has received the results. If you do not hear from us within 7 days, please contact the clinic through Fot[employer] or phone. If you have a critical or abnormal lab result, we will notify you by phone as soon as possible.  Submit refill requests through [employer] or call your pharmacy and they will forward the refill request to us. Please allow 3 business days for your refill to be completed.          Additional Information About Your Visit        PlyfeharHipscan Information     [employer] gives you secure access to your electronic health record. If you see a primary care provider, you can also send messages to your care team and make appointments. If you have questions, please call your primary care clinic.  If you do not have a primary care provider, please call [phone] and they will assist you.        Care EveryWhere ID     This is your Care EveryWhere ID. This could be used by other organizations to access your [facility] medical records  [id number]        Your Vitals Were     Pulse Temperature Respirations BMI  (Body Mass Index)          98 98.4  F (36.9  C) (Oral) 20 30.17 kg/m2         Blood Pressure from Last 3 Encounters:   05/04/18 122/77   04/19/18 122/71   01/11/17 120/70    Weight from Last 3 Encounters:   05/04/18 235 lb (106.6 kg)   04/19/18 237 lb (107.5 kg)   01/11/17 218 lb 1.6 oz (98.9 kg)                 Today's Medication Changes          These changes are accurate as of 5/4/18  2:07 PM.  If you have any questions, ask your nurse or doctor.               Start taking these medicines.        Dose/Directions    allopurinol 100 MG tablet   Commonly known as:  ZYLOPRIM   Used for:  Acute idiopathic gout of left foot   Started by:  Kaylan Coats MD        Dose:  100 mg   Take 1 tablet (100 mg) by mouth daily   Quantity:  30 tablet   Refills:  1       indomethacin 25 MG capsule   Commonly known as:  INDOCIN   Used for:  Acute idiopathic gout of left foot   Started by:  Kaylan Coats MD        Dose:  25 mg   Take 1 capsule (25 mg) by mouth 2 times daily (with meals)   Quantity:  42 capsule   Refills:  1         Stop taking these medicines if you haven't already. Please contact your care team if you have questions.     predniSONE 20 MG tablet   Commonly known as:  DELTASONE   Stopped by:  Kaylan Coats MD                Where to get your medicines      These medications were sent to [place] [address] [place] [facility] [address]  [address] [facility] [address]     Phone:  [phone]     allopurinol 100 MG tablet    indomethacin 25 MG capsule                Primary Care Provider Fax #    Physician No Ref-Primary [phone]       No address on file        Equal Access to Services     BONIFACIO MUSA : Juliana Aguilar, tresa valladares, audi keating, ritchie melendez. [facility] [phone].    ATENCIÓN: Si habla español, tiene a lovell disposición servicios gratuitos de asistencia lingüística. Llame al  [phone].    We comply with applicable federal civil rights laws and [place] laws. We do not discriminate on the basis of race, color, national origin, age, disability, sex, sexual orientation, or gender identity.            Thank you!     Thank you for choosing [facility]  for your care. Our goal is always to provide you with excellent care. Hearing back from our patients is one way we can continue to improve our services. Please take a few minutes to complete the written survey that you may receive in the mail after your visit with us. Thank you!             Your Updated Medication List - Protect others around you: Learn how to safely use, store and throw away your medicines at www.disposemymeds.org.          This list is accurate as of 5/4/18  2:07 PM.  Always use your most recent med list.                   Brand Name Dispense Instructions for use Diagnosis    allopurinol 100 MG tablet    ZYLOPRIM    30 tablet    Take 1 tablet (100 mg) by mouth daily    Acute idiopathic gout of left foot       indomethacin 25 MG capsule    INDOCIN    42 capsule    Take 1 capsule (25 mg) by mouth 2 times daily (with meals)    Acute idiopathic gout of left foot       omeprazole 10 MG CR capsule    priLOSEC    60 capsule    Take by mouth 30-60 minutes before a meal.

## 2018-05-04 NOTE — PATIENT INSTRUCTIONS
Follow up in 1 week or sooner   Start indomethacin 2 tablets twice a day till Sunday. On Monday take 1 tablet daily and stop 1 day after pain resolution  Start allopurinol

## 2019-07-08 ENCOUNTER — OFFICE VISIT (OUTPATIENT)
Dept: FAMILY MEDICINE | Facility: CLINIC | Age: 39
End: 2019-07-08
Payer: COMMERCIAL

## 2019-07-08 VITALS
BODY MASS INDEX: 31.06 KG/M2 | WEIGHT: 242 LBS | DIASTOLIC BLOOD PRESSURE: 76 MMHG | RESPIRATION RATE: 14 BRPM | TEMPERATURE: 98.1 F | HEART RATE: 75 BPM | HEIGHT: 74 IN | SYSTOLIC BLOOD PRESSURE: 138 MMHG

## 2019-07-08 DIAGNOSIS — L72.11 PILAR CYST: ICD-10-CM

## 2019-07-08 DIAGNOSIS — R22.9 LOCAL SUPERFICIAL SWELLING, MASS OR LUMP: ICD-10-CM

## 2019-07-08 DIAGNOSIS — R06.83 SNORING: ICD-10-CM

## 2019-07-08 DIAGNOSIS — Z00.00 ROUTINE GENERAL MEDICAL EXAMINATION AT A HEALTH CARE FACILITY: Primary | ICD-10-CM

## 2019-07-08 DIAGNOSIS — K21.9 GASTROESOPHAGEAL REFLUX DISEASE, ESOPHAGITIS PRESENCE NOT SPECIFIED: ICD-10-CM

## 2019-07-08 PROCEDURE — 36415 COLL VENOUS BLD VENIPUNCTURE: CPT | Performed by: NURSE PRACTITIONER

## 2019-07-08 PROCEDURE — 99396 PREV VISIT EST AGE 40-64: CPT | Performed by: NURSE PRACTITIONER

## 2019-07-08 PROCEDURE — 99213 OFFICE O/P EST LOW 20 MIN: CPT | Mod: 25 | Performed by: NURSE PRACTITIONER

## 2019-07-08 PROCEDURE — 80061 LIPID PANEL: CPT | Performed by: NURSE PRACTITIONER

## 2019-07-08 PROCEDURE — 80053 COMPREHEN METABOLIC PANEL: CPT | Performed by: NURSE PRACTITIONER

## 2019-07-08 ASSESSMENT — ENCOUNTER SYMPTOMS
SORE THROAT: 0
WEAKNESS: 0
CHILLS: 0
ARTHRALGIAS: 1
SHORTNESS OF BREATH: 0
COUGH: 0
PARESTHESIAS: 0
NERVOUS/ANXIOUS: 0
DYSURIA: 0
HEMATURIA: 0
FEVER: 0
EYE PAIN: 0
MYALGIAS: 0
HEARTBURN: 0
FREQUENCY: 0
ABDOMINAL PAIN: 0
JOINT SWELLING: 0
CONSTIPATION: 0
DIZZINESS: 0
NAUSEA: 0
PALPITATIONS: 0
HEADACHES: 0
HEMATOCHEZIA: 0
DIARRHEA: 1

## 2019-07-08 ASSESSMENT — MIFFLIN-ST. JEOR: SCORE: 2077.45

## 2019-07-08 NOTE — PROGRESS NOTES
"SUBJECTIVE:   CC: Kvng Peace is an 39 year old male who presents for preventative health visit.     Healthy Habits:     Getting at least 3 servings of Calcium per day:  Yes    Bi-annual eye exam:  Yes    Dental care twice a year:  Yes    Sleep apnea or symptoms of sleep apnea:  Excessive snoring    Diet:  Regular (no restrictions)    Frequency of exercise:  6-7 days/week    Duration of exercise:  Greater than 60 minutes    Taking medications regularly:  Yes    Medication side effects:  None    PHQ-2 Total Score: 0    Additional concerns today:  Yes      Patient would like to discuss a lump on his right wrist which is painful, and a lump on his head which is not painful. Patient would also like to discuss the heart burn he has been having along with snoring which both started around the same time 3 years ago.     Wrist: bump on wrist has been there a couple years.  Causes him pain when he goes golfing.  At times gets bigger--such as after golfing or using hand more.      Head: bump on head has been there about a year.  Six months started to notice it got some bigger.  He does fiddle with it and thinks this made it bigger.  Has tried to express something out of it, but nothing comes out.     Heartburn: he takes OTC meds, but doesn't take these consistently.  Work when he takes them regularly.  He drinks 2-3 cups of coffee/day and one 12 oz can of soda.  He tries to avoid spicy foods.  Can tell what foods to avoid to keep symptoms at bay.  He no longer is smoking.      Snoring: Worse if he is laying on his back or alcoholic drinks.  Wife has told him that he \"stops breathing\" during the night.  He wakes up at 4 am to go to the gym.  Doesn't feel tired when he wakes up.        Today's PHQ-2 Score:   PHQ-2 ( 1999 Pfizer) 7/8/2019   Q1: Little interest or pleasure in doing things 0   Q2: Feeling down, depressed or hopeless 0   PHQ-2 Score 0   Q1: Little interest or pleasure in doing things Not at all   Q2: Feeling " down, depressed or hopeless Not at all   PHQ-2 Score 0       Abuse: Current or Past(Physical, Sexual or Emotional)- No  Do you feel safe in your environment? Yes    Social History     Tobacco Use     Smoking status: Former Smoker     Packs/day: 1.00     Years: 15.00     Pack years: 15.00     Types: Cigarettes     Smokeless tobacco: Never Used   Substance Use Topics     Alcohol use: Yes     Alcohol/week: 0.0 oz     Comment: socially         Alcohol Use 7/8/2019   Prescreen: >3 drinks/day or >7 drinks/week? Yes   Prescreen: >3 drinks/day or >7 drinks/week? -   AUDIT SCORE  8   He denies concerns about his alcohol consumption.  He does not drink daily and only socially.      Last PSA: No results found for: PSA    Reviewed orders with patient. Reviewed health maintenance and updated orders accordingly - Yes  BP Readings from Last 3 Encounters:   07/08/19 140/88   05/04/18 122/77   04/19/18 122/71    Wt Readings from Last 3 Encounters:   07/08/19 109.8 kg (242 lb)   05/04/18 106.6 kg (235 lb)   04/19/18 107.5 kg (237 lb)                  Current Outpatient Medications   Medication Sig Dispense Refill     omeprazole (PRILOSEC) 10 MG CR capsule Take by mouth 30-60 minutes before a meal. 60 capsule      allopurinol (ZYLOPRIM) 100 MG tablet Take 1 tablet (100 mg) by mouth daily (Patient not taking: Reported on 7/8/2019) 30 tablet 1     indomethacin (INDOCIN) 25 MG capsule Take 1 capsule (25 mg) by mouth 2 times daily (with meals) (Patient not taking: Reported on 7/8/2019) 42 capsule 1     No Known Allergies    Reviewed and updated as needed this visit by clinical staff  Tobacco  Allergies  Meds  Med Hx  Surg Hx  Fam Hx  Soc Hx        Reviewed and updated as needed this visit by Provider        Past Medical History:   Diagnosis Date     CARDIOVASCULAR SCREENING; LDL GOAL LESS THAN 160 12/23/2014      Past Surgical History:   Procedure Laterality Date     wisdom teeth         Review of Systems   Constitutional: Negative  "for chills and fever.   HENT: Negative for congestion, ear pain, hearing loss and sore throat.    Eyes: Negative for pain and visual disturbance.   Respiratory: Negative for cough and shortness of breath.    Cardiovascular: Negative for chest pain, palpitations and peripheral edema.   Gastrointestinal: Positive for diarrhea. Negative for abdominal pain, constipation, heartburn, hematochezia and nausea.   Genitourinary: Negative for discharge, dysuria, frequency, genital sores, hematuria, impotence and urgency.   Musculoskeletal: Positive for arthralgias. Negative for joint swelling and myalgias.   Skin: Negative for rash.   Neurological: Negative for dizziness, weakness, headaches and paresthesias.   Psychiatric/Behavioral: Negative for mood changes. The patient is not nervous/anxious.      OBJECTIVE:   /88 (BP Location: Right arm, Patient Position: Sitting, Cuff Size: Adult Regular)   Pulse 75   Temp 98.1  F (36.7  C) (Oral)   Resp 14   Ht 1.88 m (6' 2\")   Wt 109.8 kg (242 lb)   BMI 31.07 kg/m      Physical Exam  GENERAL: healthy, alert and no distress  EYES: Eyes grossly normal to inspection, PERRL and conjunctivae and sclerae normal  HENT: ear canals and TM's normal, nose and mouth without ulcers or lesions  NECK: no adenopathy, no asymmetry, masses, or scars and thyroid normal to palpation  RESP: lungs clear to auscultation - no rales, rhonchi or wheezes  CV: regular rate and rhythm, normal S1 S2, no S3 or S4, no murmur, click or rub, no peripheral edema and peripheral pulses strong  ABDOMEN: soft, nontender, no hepatosplenomegaly, no masses and bowel sounds normal  MS: no gross musculoskeletal defects noted, no edema, ~1 cm firm nodule adjacent to the L 2nd metacarpal, slightly tender  SKIN: no suspicious lesions or rashes, 1 cm mobile nodule on the top of the head, non-tender  NEURO: Normal strength and tone, mentation intact and speech normal  PSYCH: mentation appears normal, affect " "normal/bright    Diagnostic Test Results:  Labs reviewed in Epic    ASSESSMENT/PLAN:   1. Routine general medical examination at a health care facility  Overall doing well  - Lipid panel reflex to direct LDL Non-fasting  - Comprehensive metabolic panel    2. Snoring  - SLEEP EVALUATION & MANAGEMENT REFERRAL - ADULT -[facility] [place]  [phone] (Age 18 and up); Future    3. Local superficial swelling, mass or lump  Likely cyst, causing pain.  Will have him see ortho.   - ORTHO  REFERRAL    4. Pilar cyst  If he would like to have removed will f/u with derm.   - DERMATOLOGY REFERRAL    5. Gastroesophageal reflux disease, esophagitis presence not specified  Discussed dietary changes.       COUNSELING:   Reviewed preventive health counseling, as reflected in patient instructions       Regular exercise       Healthy diet/nutrition    Estimated body mass index is 31.07 kg/m  as calculated from the following:    Height as of this encounter: 1.88 m (6' 2\").    Weight as of this encounter: 109.8 kg (242 lb).     Weight management plan: Discussed healthy diet and exercise guidelines     reports that he has quit smoking. His smoking use included cigarettes. He has a 15.00 pack-year smoking history. He has never used smokeless tobacco.      Counseling Resources:  ATP IV Guidelines  Pooled Cohorts Equation Calculator  FRAX Risk Assessment  ICSI Preventive Guidelines  Dietary Guidelines for Americans, 2010  USDA's MyPlate  ASA Prophylaxis  Lung CA Screening    RYAN Julien [facility]  "

## 2019-07-10 LAB
ALBUMIN SERPL-MCNC: 4.3 G/DL (ref 3.4–5)
ALP SERPL-CCNC: 76 U/L (ref 40–150)
ALT SERPL W P-5'-P-CCNC: 32 U/L (ref 0–70)
ANION GAP SERPL CALCULATED.3IONS-SCNC: 11 MMOL/L (ref 3–14)
AST SERPL W P-5'-P-CCNC: 22 U/L (ref 0–45)
BILIRUB SERPL-MCNC: 0.6 MG/DL (ref 0.2–1.3)
BUN SERPL-MCNC: 18 MG/DL (ref 7–30)
CALCIUM SERPL-MCNC: 9.6 MG/DL (ref 8.5–10.1)
CHLORIDE SERPL-SCNC: 106 MMOL/L (ref 94–109)
CHOLEST SERPL-MCNC: 239 MG/DL
CO2 SERPL-SCNC: 24 MMOL/L (ref 20–32)
CREAT SERPL-MCNC: 0.9 MG/DL (ref 0.66–1.25)
GFR SERPL CREATININE-BSD FRML MDRD: >90 ML/MIN/{1.73_M2}
GLUCOSE SERPL-MCNC: 79 MG/DL (ref 70–99)
HDLC SERPL-MCNC: 58 MG/DL
LDLC SERPL CALC-MCNC: 134 MG/DL
NONHDLC SERPL-MCNC: 186 MG/DL
POTASSIUM SERPL-SCNC: 4.1 MMOL/L (ref 3.4–5.3)
PROT SERPL-MCNC: 7.5 G/DL (ref 6.8–8.8)
SODIUM SERPL-SCNC: 141 MMOL/L (ref 133–144)
TRIGL SERPL-MCNC: 255 MG/DL

## 2020-01-10 ENCOUNTER — OFFICE VISIT (OUTPATIENT)
Dept: FAMILY MEDICINE | Facility: CLINIC | Age: 40
End: 2020-01-10
Payer: COMMERCIAL

## 2020-01-10 VITALS
WEIGHT: 231 LBS | HEIGHT: 74 IN | DIASTOLIC BLOOD PRESSURE: 66 MMHG | OXYGEN SATURATION: 96 % | TEMPERATURE: 97.9 F | SYSTOLIC BLOOD PRESSURE: 113 MMHG | BODY MASS INDEX: 29.65 KG/M2 | HEART RATE: 75 BPM

## 2020-01-10 DIAGNOSIS — H10.33 ACUTE CONJUNCTIVITIS OF BOTH EYES, UNSPECIFIED ACUTE CONJUNCTIVITIS TYPE: Primary | ICD-10-CM

## 2020-01-10 PROCEDURE — 99213 OFFICE O/P EST LOW 20 MIN: CPT | Performed by: PHYSICIAN ASSISTANT

## 2020-01-10 RX ORDER — POLYMYXIN B SULFATE AND TRIMETHOPRIM 1; 10000 MG/ML; [USP'U]/ML
1-2 SOLUTION OPHTHALMIC EVERY 4 HOURS
Qty: 1 BOTTLE | Refills: 0 | Status: SHIPPED | OUTPATIENT
Start: 2020-01-10 | End: 2021-09-03

## 2020-01-10 ASSESSMENT — ENCOUNTER SYMPTOMS: EYE PAIN: 1

## 2020-01-10 ASSESSMENT — MIFFLIN-ST. JEOR: SCORE: 2024.07

## 2020-01-10 NOTE — PROGRESS NOTES
Subjective     Kvng Peace is a 39 year old male who presents to clinic today for the following health issues:    Eye Problem         Concern - EYE problem  Onset: 1 day    Description:   Both eye, pink, swollen, and producing purulent drainage. No URI symptoms. No vision changes. Mild discomfort of eyes. Not trauma.     Therapies Tried and outcome: nothing     Patient Active Problem List   Diagnosis     CARDIOVASCULAR SCREENING; LDL GOAL LESS THAN 160     Acute idiopathic gout of left foot     Gastroesophageal reflux disease, esophagitis presence not specified     Past Surgical History:   Procedure Laterality Date     wisdom teeth         Social History     Tobacco Use     Smoking status: Former Smoker     Packs/day: 1.00     Years: 15.00     Pack years: 15.00     Types: Cigarettes     Smokeless tobacco: Never Used   Substance Use Topics     Alcohol use: Yes     Alcohol/week: 0.0 standard drinks     Comment: socially     Family History   Problem Relation Age of Onset     Diabetes Mother         T2     Cancer Father         kidney: 64:      Cancer Maternal Grandfather         lung     Diabetes Paternal Grandmother      Diabetes Paternal Grandfather          Current Outpatient Medications   Medication Sig Dispense Refill     trimethoprim-polymyxin b (POLYTRIM) 37659-4.1 UNIT/ML-% ophthalmic solution Place 1-2 drops into both eyes every 4 hours 1 Bottle 0     omeprazole (PRILOSEC) 10 MG CR capsule Take by mouth 30-60 minutes before a meal. 60 capsule      No Known Allergies  Recent Labs   Lab Test 19  1634 18  0916 16  1125 14  0941   *  --  137* 114   HDL 58  --  62 57   TRIG 255*  --  167* 104   ALT 32 20  --   --    CR 0.90 1.01  --   --    GFRESTIMATED >90 82  --   --    GFRESTBLACK >90 >90  --   --    POTASSIUM 4.1 4.6  --   --       BP Readings from Last 3 Encounters:   01/10/20 113/66   19 138/76   18 122/77    Wt Readings from Last 3 Encounters:   01/10/20  "104.8 kg (231 lb)   07/08/19 109.8 kg (242 lb)   05/04/18 106.6 kg (235 lb)                    Reviewed and updated as needed this visit by Provider  Tobacco  Allergies  Meds  Problems  Med Hx  Surg Hx  Fam Hx         Review of Systems   Eyes: Positive for pain.    other than above,   ROS COMP: Constitutional, HEENT, cardiovascular, pulmonary, gi and gu systems are negative, except as otherwise noted.      Objective    /66 (BP Location: Right arm, Patient Position: Chair, Cuff Size: Adult Large)   Pulse 75   Temp 97.9  F (36.6  C) (Oral)   Ht 1.874 m (6' 1.78\")   Wt 104.8 kg (231 lb)   SpO2 96%   BMI 29.84 kg/m    Body mass index is 29.84 kg/m .  Physical Exam   GENERAL: healthy, alert and no distress  EYES: PERRL, EOMI, visual fields normal and conjunctiva/corneas- conjunctival injection OU and yellow colored discharge present both  PSYCH: mentation appears normal, affect normal/bright    Diagnostic Test Results:  none         Assessment & Plan     (H10.33) Acute conjunctivitis of both eyes, unspecified acute conjunctivitis type  (primary encounter diagnosis)  Comment: present on exam. Given purulent drainage, likely bacterial. However, viral is possible. Recommending treatment and contact precautions until symptoms resolve. If no improvement or worsening in 1 week, recommending RTC.   Plan: trimethoprim-polymyxin b (POLYTRIM) 04766-0.1         UNIT/ML-% ophthalmic solution        -Medication use and side effects discussed with the patient. Patient is in complete understanding and agreement with plan.                   Return in about 6 months (around 7/10/2020) for Lab Work, Physical Exam.    Bob Jara PA-C  [facility]"

## 2020-01-10 NOTE — PATIENT INSTRUCTIONS
Patient Education     Conjunctivitis, Nonspecific    The membrane that covers the white part of your eye (the conjunctiva) is inflamed. Inflammation happens when your body responds to an injury, allergic reaction, infection, or illness. Symptoms of inflammation in the eye may include redness, irritation, itching, swelling, or burning. These symptoms should go away within the next 24 hours. Conjunctivitis may be related to a particle that was in your eye. If so, it may wash out with your tears or irrigation treatment. Being exposed to liquid chemicals or fumes may also cause this reaction.   Home care    Apply a cold pack over the eye for 20 minutes at a time. This will reduce pain. To make a cold pack, put ice cubes in a plastic bag that seals at the top. Wrap the bag in a clean, thin towel or cloth.    Artificial tears may be prescribed to reduce irritation or redness.  These should be used 3 to 4 times a day.    You may use acetaminophen or ibuprofen to control pain, unless another medicine was prescribed. (Note: If you have chronic liver or kidney disease, or if you have ever had a stomach ulcer or gastrointestinal bleeding, talk with your healthcare provider before using these medicines.)  Follow-up care  Follow up with your healthcare provider, or as advised.  When to seek medical advice  Call your healthcare provider right away if any of these occur:    Increased eyelid swelling    Increased eye pain    Increased redness or drainage from the eye    Increased blurry vision or increased sensitivity to light    Failure of normal vision to return within 24 to 48 hours  Date Last Reviewed: 7/1/2017 2000-2019 [employer]. [address] [place] [address], [place] [address]. All rights reserved. This information is not intended as a substitute for professional medical care. Always follow your healthcare professional's instructions.

## 2021-09-01 ENCOUNTER — NURSE TRIAGE (OUTPATIENT)
Dept: FAMILY MEDICINE | Facility: CLINIC | Age: 41
End: 2021-09-01

## 2021-09-01 NOTE — TELEPHONE ENCOUNTER
Pt calls, has appointment Friday, see triage note, discussed, added to appointment on Friday, pt has vague symptom and occasional pain between scrotal area and rectum, feels different when urinates, extremely mild, discussed, agrees to go to  sooner otherwise will discuss at upcoming visit, pt also informs father and brother had kidney tumor issues, recommend update/discuss with provider at visit, agrees      Reason for Disposition    All other urine symptoms    Additional Information    Negative: Shock suspected (e.g., cold/pale/clammy skin, too weak to stand, low BP, rapid pulse)    Negative: Sounds like a life-threatening emergency to the triager    Negative: Followed a genital area injury    Negative: Followed a genital area injury (penis, scrotum)    Negative: Vaginal discharge    Negative: Pus (white, yellow) or bloody discharge from end of penis    Negative: Discomfort (pain, burning or stinging) when passing urine and pregnant    Negative: Discomfort (pain, burning or stinging) when passing urine and female    Negative: Discomfort (pain, burning or stinging) when passing urine and male    Negative: Pain or itching in the vulvar area    Negative: Pain in scrotum is main symptom    Negative: Blood in the urine is main symptom    Negative: Symptoms arising from use of a urinary catheter (Lee or Coude)    Negative: Unable to urinate (or only a few drops) > 4 hours and bladder feels very full (e.g., palpable bladder or strong urge to urinate)    Negative: Decreased urination and drinking very little and dehydration suspected (e.g., dark urine, no urine > 12 hours, very dry mouth, very lightheaded)    Negative: Patient sounds very sick or weak to the triager    Negative: Fever > 100.4 F (38.0 C)    Negative: Side (flank) or lower back pain present    Negative: Can't control passage of urine (i.e., urinary incontinence) and new onset (< 2 weeks) or worsening    Negative: Urinating more frequently than usual  "(i.e., frequency)    Negative: Bad or foul-smelling urine    Negative: Patient wants to be seen    Negative: Can't control passage of urine (i.e., urinary incontinence, wetting self) and present > 2 weeks    Negative: Urination is difficult to start (i.e., hesitancy) or straining    Negative: Dribbling (losing urine) just after finishing urination (i.e., post-void dribbling)    Negative: Has to get out of bed to urinate > 2 times a night (i.e., nocturia)    Answer Assessment - Initial Assessment Questions  1. SYMPTOM: \"What's the main symptom you're concerned about?\" (e.g., frequency, incontinence)      Feels twinges when urinates  2. ONSET: \"When did the  1 week ago start?\"      About 1 week ago  3. PAIN: \"Is there any pain?\" If so, ask: \"How bad is it?\" (Scale: 1-10; mild, moderate, severe)      Extremely mild   4. CAUSE: \"What do you think is causing the symptoms?\"      No idea  5. OTHER SYMPTOMS: \"Do you have any other symptoms?\" (e.g., fever, flank pain, blood in urine, pain with urination)      No   6. PREGNANCY: \"Is there any chance you are pregnant?\" \"When was your last menstrual period?\"      n/a    Protocols used: URINARY SYMPTOMS-A-[place]    "

## 2021-09-03 ENCOUNTER — OFFICE VISIT (OUTPATIENT)
Dept: FAMILY MEDICINE | Facility: CLINIC | Age: 41
End: 2021-09-03
Payer: COMMERCIAL

## 2021-09-03 ENCOUNTER — ANCILLARY PROCEDURE (OUTPATIENT)
Dept: GENERAL RADIOLOGY | Facility: CLINIC | Age: 41
End: 2021-09-03
Attending: PHYSICIAN ASSISTANT
Payer: COMMERCIAL

## 2021-09-03 VITALS
DIASTOLIC BLOOD PRESSURE: 68 MMHG | WEIGHT: 232.3 LBS | SYSTOLIC BLOOD PRESSURE: 122 MMHG | OXYGEN SATURATION: 95 % | TEMPERATURE: 98.1 F | BODY MASS INDEX: 29.81 KG/M2 | HEIGHT: 74 IN | HEART RATE: 82 BPM

## 2021-09-03 DIAGNOSIS — L65.9 HAIR LOSS: ICD-10-CM

## 2021-09-03 DIAGNOSIS — M25.531 RIGHT WRIST PAIN: Primary | ICD-10-CM

## 2021-09-03 DIAGNOSIS — R39.9 URINARY SYMPTOM OR SIGN: ICD-10-CM

## 2021-09-03 DIAGNOSIS — Z80.51 FAMILY HISTORY OF CANCER OF THE KIDNEY: ICD-10-CM

## 2021-09-03 LAB
ALBUMIN UR-MCNC: NEGATIVE MG/DL
APPEARANCE UR: CLEAR
BILIRUB UR QL STRIP: NEGATIVE
COLOR UR AUTO: YELLOW
GLUCOSE UR STRIP-MCNC: NEGATIVE MG/DL
HGB UR QL STRIP: NEGATIVE
KETONES UR STRIP-MCNC: NEGATIVE MG/DL
LEUKOCYTE ESTERASE UR QL STRIP: NEGATIVE
NITRATE UR QL: NEGATIVE
PH UR STRIP: 5.5 [PH] (ref 5–7)
SP GR UR STRIP: 1.02 (ref 1–1.03)
UROBILINOGEN UR STRIP-ACNC: 0.2 E.U./DL

## 2021-09-03 PROCEDURE — 81003 URINALYSIS AUTO W/O SCOPE: CPT | Performed by: PHYSICIAN ASSISTANT

## 2021-09-03 PROCEDURE — 99214 OFFICE O/P EST MOD 30 MIN: CPT | Performed by: PHYSICIAN ASSISTANT

## 2021-09-03 PROCEDURE — 73110 X-RAY EXAM OF WRIST: CPT | Mod: RT | Performed by: RADIOLOGY

## 2021-09-03 ASSESSMENT — MIFFLIN-ST. JEOR: SCORE: 2023.46

## 2021-09-03 NOTE — PROGRESS NOTES
Assessment & Plan     Right wrist pain  Unclear etiology, mostly like a strain. No signs of ganglion cyst and Finkelstein's testing negative. X-ray obtained to rule out fracture (though I think this is unlikely).  Try a wrist brace to prevent wrist from extension or hyperextension that is triggering pain. Follow up with orthopedics if not improving.  - XR Wrist Right G/E 3 Views  - Orthopedic  Referral; Future    Urinary symptom or sign  UA normal. Discussed common bladder irritants and recommended trying to abstain from them. Follow up with urology if not improving.  - UA Macro with Reflex to Micro and Culture - lab collect; Future  - UA Macro with Reflex to Micro and Culture - lab collect  - Adult Urology Referral; Future    Hair loss  He noticed a patch of hair missing following his first COVID vaccine. Wondering what to do about it. Referral to dermatology placed.  - Adult Dermatology Referral; Future    Family history of cancer of the kidney  Referral to urology placed.  - Adult Urology Referral; Future    Triage nurse note reviewed.  Review of external notes as documented elsewhere in note  Review of the result(s) of each unique test - x-ray  Ordering of each unique test    Candida Us PA-C  [facility] HARSH Locke is a 41 year old who presents for the following health issues    Musculoskeletal Problem         Musculoskeletal problem/pain  Onset/Duration:about a month       Description  Location: wrist - right (near the base of the thumb)  Joint Swelling: no  Redness: no  Pain: YES  Warmth: no  Intensity:  Mild- currently 0/10 in severity, 6-7/10 in severity at its worst  Progression of Symptoms:  same  Accompanying signs and symptoms:   Fevers: no  Numbness/tingling/weakness: YES (initially had some numbness/tingling and weakness, not currently)  History  Trauma to the area: Felt a sharp pain and pop in the right wrist after hyperextension of the  "wrist  Recent illness:  no  Previous similar problem: no  Previous evaluation:  no  Precipitating or alleviating factors:  Aggravating factors include: extension of the wrist with any amount of weight or resistance  Therapies tried and outcome: nothing      Genitourinary - Male  Onset/Duration: 8 days ago  Description:   Dysuria (painful urination): dull tingle  Hematuria (blood in urine): no  Frequency: no  Waking at night to urinate: no  Hesitancy (delay in urine): yes, no decrease in stream  Retention (unable to empty): somewhat- perhaps some difficulty completely emptying  Decrease in urinary flow: no  Incontinence: no  Progression of Symptoms:  same  Accompanying Signs & Symptoms:  Fever: no  Back/Flank pain: no  Urethral discharge: no  Testicle lumps/masses/pain: no  Nausea and/or vomiting: no  Abdominal pain: no  History:   History of frequent UTI s: No  History of kidney stones: no  History of hernias: no  Personal or Family history of Prostate problems: no- brother-kidney removal (kidney cancer) and father had kidney cancer and ureter cancer ( at 64)  Sexually active: YES  Precipitating or alleviating factors: None  Therapies tried and outcome: none    Patient notes he is having some discomfort in the perineum with urination. He denies any pain in the testicles or masses noted.    Review of Systems   GENERAL:  No fevers  :  As noted in HPI        Objective    /68 (BP Location: Right arm, Patient Position: Chair, Cuff Size: Adult Large)   Pulse 82   Temp 98.1  F (36.7  C) (Oral)   Ht 1.88 m (6' 2\")   Wt 105.4 kg (232 lb 4.8 oz)   SpO2 95%   BMI 29.83 kg/m    Body mass index is 29.83 kg/m .  Physical Exam   GENERAL: No acute distress  HEENT: Normocephalic  VASCULAR: 2+ right radial pulse  EXTREMITIES:  Right upper extremity: Full range of motion at the wrist (able to flex and extend, deviate to ulnar and radial sides). No tenderness over the anatomic snuff box. No ganglion cyst palpated. " Able to oppose thumb to all fingers. Negative Finkelstein's test.  SKIN: Patch of hair missing in patient's right beard  NEURO: Alert, non-focal      Results for orders placed or performed in visit on 09/03/21   XR Wrist Right G/E 3 Views    Impression    IMPRESSION: Negative exam.    CRYSTAL PANTOJA MD         SYSTEM ID:  [id number]

## 2021-09-03 NOTE — PATIENT INSTRUCTIONS
Bladder irritants also can cause symptoms similar to a bladder infection. Bladder irritants include: caffeine (tea and coffee), alcohol, apple juice, lemon juice, soy sauce, carbonated drinks, pineapple, strawberries, tomatoes, yogurt and vinegar.    Use wrist brace for at least 2 weeks.    Can use diclofenac gel as needed for pain.

## 2021-10-14 ASSESSMENT — ANXIETY QUESTIONNAIRES
1. FEELING NERVOUS, ANXIOUS, OR ON EDGE: NEARLY EVERY DAY
3. WORRYING TOO MUCH ABOUT DIFFERENT THINGS: NEARLY EVERY DAY
2. NOT BEING ABLE TO STOP OR CONTROL WORRYING: NEARLY EVERY DAY
4. TROUBLE RELAXING: MORE THAN HALF THE DAYS
6. BECOMING EASILY ANNOYED OR IRRITABLE: MORE THAN HALF THE DAYS
GAD7 TOTAL SCORE: 16
GAD7 TOTAL SCORE: 16
5. BEING SO RESTLESS THAT IT IS HARD TO SIT STILL: NOT AT ALL
7. FEELING AFRAID AS IF SOMETHING AWFUL MIGHT HAPPEN: NEARLY EVERY DAY
8. IF YOU CHECKED OFF ANY PROBLEMS, HOW DIFFICULT HAVE THESE MADE IT FOR YOU TO DO YOUR WORK, TAKE CARE OF THINGS AT HOME, OR GET ALONG WITH OTHER PEOPLE?: SOMEWHAT DIFFICULT
GAD7 TOTAL SCORE: 16
7. FEELING AFRAID AS IF SOMETHING AWFUL MIGHT HAPPEN: NEARLY EVERY DAY

## 2021-10-14 ASSESSMENT — PATIENT HEALTH QUESTIONNAIRE - PHQ9
10. IF YOU CHECKED OFF ANY PROBLEMS, HOW DIFFICULT HAVE THESE PROBLEMS MADE IT FOR YOU TO DO YOUR WORK, TAKE CARE OF THINGS AT HOME, OR GET ALONG WITH OTHER PEOPLE: VERY DIFFICULT
SUM OF ALL RESPONSES TO PHQ QUESTIONS 1-9: 6
SUM OF ALL RESPONSES TO PHQ QUESTIONS 1-9: 6

## 2021-10-15 ENCOUNTER — VIRTUAL VISIT (OUTPATIENT)
Dept: FAMILY MEDICINE | Facility: CLINIC | Age: 41
End: 2021-10-15
Payer: COMMERCIAL

## 2021-10-15 DIAGNOSIS — F41.1 GAD (GENERALIZED ANXIETY DISORDER): Primary | ICD-10-CM

## 2021-10-15 PROCEDURE — 99214 OFFICE O/P EST MOD 30 MIN: CPT | Mod: 95 | Performed by: FAMILY MEDICINE

## 2021-10-15 RX ORDER — LORAZEPAM 0.5 MG/1
0.5 TABLET ORAL DAILY PRN
Qty: 5 TABLET | Refills: 0 | Status: SHIPPED | OUTPATIENT
Start: 2021-10-15 | End: 2021-11-26

## 2021-10-15 RX ORDER — ESCITALOPRAM OXALATE 10 MG/1
TABLET ORAL
Qty: 30 TABLET | Refills: 1 | Status: SHIPPED | OUTPATIENT
Start: 2021-10-15 | End: 2021-11-26

## 2021-10-15 ASSESSMENT — PATIENT HEALTH QUESTIONNAIRE - PHQ9: SUM OF ALL RESPONSES TO PHQ QUESTIONS 1-9: 6

## 2021-10-15 ASSESSMENT — ENCOUNTER SYMPTOMS: NERVOUS/ANXIOUS: 1

## 2021-10-15 ASSESSMENT — ANXIETY QUESTIONNAIRES: GAD7 TOTAL SCORE: 16

## 2021-10-15 NOTE — PROGRESS NOTES
Chucky is a 41 year old who is being evaluated via a billable video visit.      How would you like to obtain your AVS? MyChart  If the video visit is dropped, the invitation should be resent by: Text to cell phone: [phone]  Will anyone else be joining your video visit? No       Video Start Time: 2:06 PM    Assessment & Plan     KIMBERLY (generalized anxiety disorder) - new issue, discussed treatment options. He would like to start medication. reviewed potential side effects with SSRIs. Will also give short script of benzo to help with anxiety while we titrate up selective serotonin reuptake inhibitor.   - escitalopram (LEXAPRO) 10 MG tablet; Take 1/2 tablet for 1-2 weeks, then increase to 1 tablet daily  - LORazepam (ATIVAN) 0.5 MG tablet; Take 1 tablet (0.5 mg) by mouth daily as needed for anxiety      Return in about 6 weeks (around 11/26/2021) for Virtual Visit, Medication Recheck.    January Valenzuela MD  [facility]   Chucky is a 41 year old who presents for the following health issues     Anxiety    History of Present Illness       Mental Health Follow-up:  Patient presents to follow-up on Depression & Anxiety.Patient's depression since last visit has been:  Worse  The patient is not having other symptoms associated with depression.  Patient's anxiety since last visit has been:  Worse  The patient is not having other symptoms associated with anxiety.  Any significant life events: job concerns  Patient is feeling anxious or having panic attacks.  Patient has no concerns about alcohol or drug use.     Social History  Tobacco Use    Smoking status: Former Smoker      Packs/day: 1.00      Years: 15.00      Pack years: 15      Types: Cigarettes    Smokeless tobacco: Never Used  Vaping Use    Vaping Use: Never used  Alcohol use: Yes    Alcohol/week: 0.0 standard drinks    Comment: socially  Drug use: No      Today's PHQ-9         PHQ-9 Total Score:     (P) 6   PHQ-9 Q9 Thoughts of  Goal Outcome Evaluation:            better off dead/self-harm past 2 weeks :   (P) Not at all   Thoughts of suicide or self harm:      Self-harm Plan:        Self-harm Action:          Safety concerns for self or others:           He eats 0-1 servings of fruits and vegetables daily.He consumes 0 sweetened beverage(s) daily.He exercises with enough effort to increase his heart rate 30 to 60 minutes per day.  He exercises with enough effort to increase his heart rate 5 days per week.   He is taking medications regularly.       Reports stressful job. On a project that could last over the next year.   Having a hard time sleeping - waking with thoughts about work, constant anxiety.     Anxiety has not been a big issue for him in life. Over the past 2 years, the work stress has finally gotten to him.     Unable to actively participate in other enjoyable activities because he's so wrapped up in work.     No known history of anxiety.   Paternal gma with heart disease.       Review of Systems   Psychiatric/Behavioral: The patient is nervous/anxious.       Constitutional, HEENT, cardiovascular, pulmonary, gi and gu systems are negative, except as otherwise noted.      Objective           Vitals:  No vitals were obtained today due to virtual visit.    Physical Exam   GENERAL: Healthy, alert and no distress  EYES: Eyes grossly normal to inspection.  No discharge or erythema, or obvious scleral/conjunctival abnormalities.  RESP: No audible wheeze, cough, or visible cyanosis.  No visible retractions or increased work of breathing.    SKIN: Visible skin clear. No significant rash, abnormal pigmentation or lesions.  NEURO: Cranial nerves grossly intact.  Mentation and speech appropriate for age.  PSYCH: Mentation appears normal, affect normal/bright, judgement and insight intact, normal speech and appearance well-groomed.    KIMBERLY-7 SCORE 10/14/2021   Total Score 16 (severe anxiety)   Total Score 16       PHQ 10/14/2021   PHQ-9 Total Score 6   Q9: Thoughts of better  off dead/self-harm past 2 weeks Not at all           Video-Visit Details    Type of service:  Video Visit    Video End Time:2:26 PM    Originating Location (pt. Location): Home    Distant Location (provider location):  [facility]     Platform used for Video Visit: [employer]  Answers for HPI/ROS submitted by the patient on 10/14/2021  If you checked off any problems, how difficult have these problems made it for you to do your work, take care of things at home, or get along with other people?: Very difficult  PHQ9 TOTAL SCORE: 6  KIMBERLY 7 TOTAL SCORE: 16

## 2021-10-20 ENCOUNTER — DOCUMENTATION ONLY (OUTPATIENT)
Dept: ONCOLOGY | Facility: CLINIC | Age: 41
End: 2021-10-20

## 2021-10-20 ENCOUNTER — VIRTUAL VISIT (OUTPATIENT)
Dept: UROLOGY | Facility: CLINIC | Age: 41
End: 2021-10-20
Attending: PHYSICIAN ASSISTANT
Payer: COMMERCIAL

## 2021-10-20 VITALS — BODY MASS INDEX: 29 KG/M2 | WEIGHT: 226 LBS | HEIGHT: 74 IN

## 2021-10-20 DIAGNOSIS — R39.9 URINARY SYMPTOM OR SIGN: ICD-10-CM

## 2021-10-20 DIAGNOSIS — Z80.51 FAMILY HISTORY OF CANCER OF THE KIDNEY: Primary | ICD-10-CM

## 2021-10-20 PROCEDURE — 99203 OFFICE O/P NEW LOW 30 MIN: CPT | Mod: 95 | Performed by: STUDENT IN AN ORGANIZED HEALTH CARE EDUCATION/TRAINING PROGRAM

## 2021-10-20 ASSESSMENT — MIFFLIN-ST. JEOR: SCORE: 1994.88

## 2021-10-20 ASSESSMENT — PAIN SCALES - GENERAL: PAINLEVEL: NO PAIN (0)

## 2021-10-20 NOTE — PATIENT INSTRUCTIONS
PLease schedule for a US Kidneys  I will call with results/ Rend My chart Message  Please schedule for a discussion with genetic Counsellor

## 2021-10-20 NOTE — LETTER
10/20/2021       RE: Kvng Peace  [address] [facility] [address]     Dear Colleague,    Thank you for referring your patient, Kvng Peace, to the [facility] UROLOGY CL[facility] [place] at [facility]. Please see a copy of my visit note below.    *PT WILL MEET YOU IN MYCHART*    Chucky is a 41 year old who is being evaluated via a billable video visit.      How would you like to obtain your AVS? MyChart  If the video visit is dropped, the invitation should be resent by: Text to cell phone: [phone]  Will anyone else be joining your video visit? No      Video Start Time: 8:52 AM      Video-Visit Details    Type of service:  Video Visit    Video End Time:9:00 AM    Originating Location (pt. Location): Home    Distant Location (provider location):  [facility] UROLOGY [facility]     Platform used for Video Visit: [employer]        Chief Complaint:   Family history of renal neoplasms           Consult or Referral:     Mr. Kvng Peace is a 41 year old male seen at the request of Dr. Us.         History of Present Illness:    Kvng Peace is a very pleasant 41 year old male who presents with a history of family history of renal neoplasms.      Reviewed previous notes from Dr. Candida Patel PA-C  Chucky is here to discuss his risks of having kidney cancers as he is concerned about the fact that his father and brother both have had tumors in the kidney.  If his father was diagnosed possibly with urothelial cancer of the ureter when he was 64 and had widespread metastasis at the time of diagnosis.  His brother recently in his 50s was diagnosed with kidney cancer.  I believe this is renal cell carcinoma but we are not sure of the exact etiology and he has recently undergone surgery with a follow-up which was normal.  He is brother was also recommended genetic counseling and is here to see further advice regarding his poor risks of  kidney neoplasms    He does not have any symptoms of flank pain hematuria or palpable the mass in the abdomen.  He does tell me that he has had some discomfort occasionally while going to the bathroom but he is not able to characterize that discomfort.         Past Medical History:     Past Medical History:   Diagnosis Date     CARDIOVASCULAR SCREENING; LDL GOAL LESS THAN 160 2014     Gout      Palpitations             Past Surgical History:     Past Surgical History:   Procedure Laterality Date     VASOVASOSTOMY       wisdom teeth              Medications     Current Outpatient Medications   Medication     escitalopram (LEXAPRO) 10 MG tablet     LORazepam (ATIVAN) 0.5 MG tablet     omeprazole (PRILOSEC) 10 MG CR capsule     No current facility-administered medications for this visit.            Family History:     Family History   Problem Relation Age of Onset     Diabetes Mother         T2     Cancer Father         kidney: 64:      Cancer Maternal Grandfather         lung     Diabetes Paternal Grandmother      Diabetes Paternal Grandfather             Social History:     Social History     Socioeconomic History     Marital status:      Spouse name: grabiel     Number of children: 1     Years of education: Not on file     Highest education level: Not on file   Occupational History     Occupation: progect manager   Tobacco Use     Smoking status: Former Smoker     Packs/day: 1.00     Years: 15.00     Pack years: 15.00     Types: Cigarettes     Smokeless tobacco: Never Used   Vaping Use     Vaping Use: Never used   Substance and Sexual Activity     Alcohol use: Yes     Alcohol/week: 0.0 standard drinks     Comment: socially     Drug use: No     Sexual activity: Yes     Partners: Female   Other Topics Concern     Parent/sibling w/ CABG, MI or angioplasty before 65F 55M? Not Asked   Social History Narrative     Not on file     Social Determinants of Health     Financial Resource Strain:       Difficulty of Paying Living Expenses:    Food Insecurity:      Worried About Running Out of Food in the Last Year:      Ran Out of Food in the Last Year:    Transportation Needs:      Lack of Transportation (Medical):      Lack of Transportation (Non-Medical):    Physical Activity:      Days of Exercise per Week:      Minutes of Exercise per Session:    Stress:      Feeling of Stress :    Social Connections:      Frequency of Communication with Friends and Family:      Frequency of Social Gatherings with Friends and Family:      Attends Orthodoxy Services:      Active Member of Clubs or Organizations:      Attends Club or Organization Meetings:      Marital Status:    Intimate Partner Violence:      Fear of Current or Ex-Partner:      Emotionally Abused:      Physically Abused:      Sexually Abused:             Allergies:   Patient has no known allergies.         Review of Systems:  From intake questionnaire     Skin: negative  Eyes: negative  Ears/Nose/Throat: negative  Respiratory: No shortness of breath, dyspnea on exertion, cough, or hemoptysis  Cardiovascular: No chest pain or palpitations  Gastrointestinal: negative; no nausea/vomiting, constipation or diarrhea  Genitourinary: as per HPI  Musculoskeletal: negative  Neurologic: negative  Psychiatric: negative  Hematologic/Lymphatic/Immunologic: negative  Endocrine: negative         Physical Exam:   This is a virtual visit    Alert, no acute distress, oriented, conversant    Ears/nose/mouth: mouth:normal, good dentition  Respiratory: no respiratory distress, or pursed lip breathing  Cardiovascular:no obvious jugular venous distension present  Skin: no suspicious lesions or rashes on Visible body parts on the Screen  Neuro: Alert, oriented, speech and mentation normal  Psych: affect and mood normal, alert and oriented to person, place and time      Labs and Pathology:    The following labs were reviewed by me and discussed with the patient:  UA:  Normal  Significant for   Lab Results   Component Value Date    CR 0.90 07/08/2019    CR 1.01 04/19/2018             Imaging:    The following imaging exams were independently viewed and interpreted by me and discussed with patient:               Assessment and Plan:     Assessment:     Urinary symptom or sign  Not sure if this is related to anything or not but the patient was not able to characterize his symptom and has had a normal urine analysis done earlier.- Adult Urology Referral    Family history of cancer of the kidney  Consider for genetic counseling for assessment of risk for kidney cancers.  I would advise an ultrasound of the kidneys just as a screening modality  - Adult Urology Referral  - US Renal Complete; Future  - Cancer Risk Mgmt/Cancer Genetic Counseling Referral; Future    Plan:  Ultrasound kidneys: We will follow-up with him with the results and see him as needed    Orders  Orders Placed This Encounter   Procedures     US Renal Complete     Cancer Risk Mgmt/Cancer Genetic Counseling Referral         Rolando Jack MD  [facility] UROLOGY CL[facility]IC [place]                  ==========================    Additional Billing and Coding Information:  Review of external notes as documented above   Review of the result(s) of each unique test - UA, creatinine    Independent interpretation of a test performed by another physician/other qualified health care professional (not separately reported) -       Discussion of management or test interpretation with external physician/other qualified healthcare professional/appropriate source -       Diagnosis or treatment significantly limited by social determinants of health -       20 minutes spent on the date of the encounter doing chart review, review of test results, interpretation of tests, patient visit and documentation     ==========================

## 2021-10-20 NOTE — PROGRESS NOTES
*PT WILL MEET YOU IN [facility]*    Chucky is a 41 year old who is being evaluated via a billable video visit.      How would you like to obtain your AVS? MyChart  If the video visit is dropped, the invitation should be resent by: Text to cell phone: [phone]  Will anyone else be joining your video visit? No      Video Start Time: 8:52 AM      Video-Visit Details    Type of service:  Video Visit    Video End Time:9:00 AM    Originating Location (pt. Location): Home    Distant Location (provider location):  [facility] UROLOGY CL[facility]IC [place]     Platform used for Video Visit: "[employer]        Chief Complaint:   Family history of renal neoplasms           Consult or Referral:     Mr. Kvng Peace is a 41 year old male seen at the request of Dr. Us.         History of Present Illness:    Kvng Peace is a very pleasant 41 year old male who presents with a history of family history of renal neoplasms.      Reviewed previous notes from Dr. Candida Patel PA-C  Chucky is here to discuss his risks of having kidney cancers as he is concerned about the fact that his father and brother both have had tumors in the kidney.  If his father was diagnosed possibly with urothelial cancer of the ureter when he was 64 and had widespread metastasis at the time of diagnosis.  His brother recently in his 50s was diagnosed with kidney cancer.  I believe this is renal cell carcinoma but we are not sure of the exact etiology and he has recently undergone surgery with a follow-up which was normal.  He is brother was also recommended genetic counseling and is here to see further advice regarding his poor risks of kidney neoplasms    He does not have any symptoms of flank pain hematuria or palpable the mass in the abdomen.  He does tell me that he has had some discomfort occasionally while going to the bathroom but he is not able to characterize that discomfort.         Past Medical History:     Past Medical History:    Diagnosis Date     CARDIOVASCULAR SCREENING; LDL GOAL LESS THAN 160 2014     Gout      Palpitations             Past Surgical History:     Past Surgical History:   Procedure Laterality Date     VASOVASOSTOMY       wisdom teeth              Medications     Current Outpatient Medications   Medication     escitalopram (LEXAPRO) 10 MG tablet     LORazepam (ATIVAN) 0.5 MG tablet     omeprazole (PRILOSEC) 10 MG CR capsule     No current facility-administered medications for this visit.            Family History:     Family History   Problem Relation Age of Onset     Diabetes Mother         T2     Cancer Father         kidney: 64:      Cancer Maternal Grandfather         lung     Diabetes Paternal Grandmother      Diabetes Paternal Grandfather             Social History:     Social History     Socioeconomic History     Marital status:      Spouse name: grabiel     Number of children: 1     Years of education: Not on file     Highest education level: Not on file   Occupational History     Occupation: [employer] manager   Tobacco Use     Smoking status: Former Smoker     Packs/day: 1.00     Years: 15.00     Pack years: 15.00     Types: Cigarettes     Smokeless tobacco: Never Used   Vaping Use     Vaping Use: Never used   Substance and Sexual Activity     Alcohol use: Yes     Alcohol/week: 0.0 standard drinks     Comment: socially     Drug use: No     Sexual activity: Yes     Partners: Female   Other Topics Concern     Parent/sibling w/ CABG, MI or angioplasty before 65F 55M? Not Asked   Social History Narrative     Not on file     Social Determinants of Health     Financial Resource Strain:      Difficulty of Paying Living Expenses:    Food Insecurity:      Worried About Running Out of Food in the Last Year:      Ran Out of Food in the Last Year:    Transportation Needs:      Lack of Transportation (Medical):      Lack of Transportation (Non-Medical):    Physical Activity:      Days of Exercise per Week:       Minutes of Exercise per Session:    Stress:      Feeling of Stress :    Social Connections:      Frequency of Communication with Friends and Family:      Frequency of Social Gatherings with Friends and Family:      Attends Christianity Services:      Active Member of Clubs or Organizations:      Attends Club or Organization Meetings:      Marital Status:    Intimate Partner Violence:      Fear of Current or Ex-Partner:      Emotionally Abused:      Physically Abused:      Sexually Abused:             Allergies:   Patient has no known allergies.         Review of Systems:  From intake questionnaire     Skin: negative  Eyes: negative  Ears/Nose/Throat: negative  Respiratory: No shortness of breath, dyspnea on exertion, cough, or hemoptysis  Cardiovascular: No chest pain or palpitations  Gastrointestinal: negative; no nausea/vomiting, constipation or diarrhea  Genitourinary: as per HPI  Musculoskeletal: negative  Neurologic: negative  Psychiatric: negative  Hematologic/Lymphatic/Immunologic: negative  Endocrine: negative         Physical Exam:   This is a virtual visit    Alert, no acute distress, oriented, conversant    Ears/nose/mouth: mouth:normal, good dentition  Respiratory: no respiratory distress, or pursed lip breathing  Cardiovascular:no obvious jugular venous distension present  Skin: no suspicious lesions or rashes on Visible body parts on the Screen  Neuro: Alert, oriented, speech and mentation normal  Psych: affect and mood normal, alert and oriented to person, place and time      Labs and Pathology:    The following labs were reviewed by me and discussed with the patient:  UA: Normal  Significant for   Lab Results   Component Value Date    CR 0.90 07/08/2019    CR 1.01 04/19/2018             Imaging:    The following imaging exams were independently viewed and interpreted by me and discussed with patient:               Assessment and Plan:     Assessment:     Urinary symptom or sign  Not sure if this is  related to anything or not but the patient was not able to characterize his symptom and has had a normal urine analysis done earlier.- Adult Urology Referral    Family history of cancer of the kidney  Consider for genetic counseling for assessment of risk for kidney cancers.  I would advise an ultrasound of the kidneys just as a screening modality  - Adult Urology Referral  - US Renal Complete; Future  - Cancer Risk Mgmt/Cancer Genetic Counseling Referral; Future    Plan:  Ultrasound kidneys: We will follow-up with him with the results and see him as needed    Orders  Orders Placed This Encounter   Procedures     US Renal Complete     Cancer Risk Mgmt/Cancer Genetic Counseling Referral         Rolando Jack MD  [facility] UROLOGY CLINIC [place]                  ==========================    Additional Billing and Coding Information:  Review of external notes as documented above   Review of the result(s) of each unique test - UA, creatinine    Independent interpretation of a test performed by another physician/other qualified health care professional (not separately reported) -       Discussion of management or test interpretation with external physician/other qualified healthcare professional/appropriate source -       Diagnosis or treatment significantly limited by social determinants of health -       20 minutes spent on the date of the encounter doing chart review, review of test results, interpretation of tests, patient visit and documentation     ==========================

## 2021-11-19 ENCOUNTER — OFFICE VISIT (OUTPATIENT)
Dept: DERMATOLOGY | Facility: CLINIC | Age: 41
End: 2021-11-19
Payer: COMMERCIAL

## 2021-11-19 VITALS — OXYGEN SATURATION: 97 % | SYSTOLIC BLOOD PRESSURE: 119 MMHG | HEART RATE: 73 BPM | DIASTOLIC BLOOD PRESSURE: 70 MMHG

## 2021-11-19 DIAGNOSIS — L63.9 ALOPECIA AREATA: Primary | ICD-10-CM

## 2021-11-19 PROCEDURE — 99203 OFFICE O/P NEW LOW 30 MIN: CPT | Performed by: PHYSICIAN ASSISTANT

## 2021-11-19 RX ORDER — PIMECROLIMUS 10 MG/G
CREAM TOPICAL
Qty: 60 G | Refills: 2 | Status: SHIPPED | OUTPATIENT
Start: 2021-11-19 | End: 2022-06-17

## 2021-11-19 NOTE — PROGRESS NOTES
HPI:   Chief complaints: Kvng Peace is a pleasant 41 year old male who presents for evaluation of hair loss in the beard. He has had a small bald patch on the right side of his beard for 6 months. It did grow some hair back which is white. No pain or itching. His grandfather had a history of alopecia totalis       PHYSICAL EXAM:    /70   Pulse 73   SpO2 97%   Skin exam performed as follows: Type 2 skin. Mood appropriate  Alert and Oriented X 3. Well developed, well nourished in no distress.  General appearance: Normal  Head including face: Normal  Eyes: conjunctiva and lids: Normal  Mouth: Lips, teeth, gums: Normal  Neck: Normal  Cardiovascular: Exam of peripheral vascular system by observation for swelling, varicosities, edema: Normal  Right upper extremity: Normal  Left upper extremity: Normal  Right lower extremity: Normal  Left lower extremity: Normal  Skin: Scalp and body hair: See below    Bald patch with few white hairs on the left lower cheek approx 6 mm in size    ASSESSMENT/PLAN:     Alopecia Areata: advised on chronic, recurrent nature of condition and diffficulty in treating. Advised on risk of failure to respond to treatment.  --Start Elidel BID x 2-3 months PRN          Follow-up: 2-3 months if needed  CC:   Scribed By: Shanti Ozuna, MS, PA-C

## 2021-11-19 NOTE — PATIENT INSTRUCTIONS
For the face, use Elidel cream twice per day for 2-3 months or until your hair is back. Once it's grown back then stop and we will see what happens.     For the scalp, you can schedule an excision with Amira Campos or Dr Nguyen.

## 2021-11-19 NOTE — LETTER
11/19/2021         RE: Kvng Peace  [address] [facility] [address]        Dear Colleague,    Thank you for referring your patient, Kvng Peace, to the [facility]. Please see a copy of my visit note below.    HPI:   Chief complaints: Kvng Peace is a pleasant 41 year old male who presents for evaluation of hair loss in the beard. He has had a small bald patch on the right side of his beard for 6 months. It did grow some hair back which is white. No pain or itching. His grandfather had a history of alopecia totalis       PHYSICAL EXAM:    /70   Pulse 73   SpO2 97%   Skin exam performed as follows: Type 2 skin. Mood appropriate  Alert and Oriented X 3. Well developed, well nourished in no distress.  General appearance: Normal  Head including face: Normal  Eyes: conjunctiva and lids: Normal  Mouth: Lips, teeth, gums: Normal  Neck: Normal  Cardiovascular: Exam of peripheral vascular system by observation for swelling, varicosities, edema: Normal  Right upper extremity: Normal  Left upper extremity: Normal  Right lower extremity: Normal  Left lower extremity: Normal  Skin: Scalp and body hair: See below    Bald patch with few white hairs on the left lower cheek approx 6 mm in size    ASSESSMENT/PLAN:     Alopecia Areata: advised on chronic, recurrent nature of condition and diffficulty in treating. Advised on risk of failure to respond to treatment.  --Start Elidel BID x 2-3 months PRN          Follow-up: 2-3 months if needed  CC:   Scribed By: Shanti Roy MS, PADACIA          Again, thank you for allowing me to participate in the care of your patient.        Sincerely,        Shanti Roy PA-C

## 2021-11-26 ENCOUNTER — VIRTUAL VISIT (OUTPATIENT)
Dept: FAMILY MEDICINE | Facility: CLINIC | Age: 41
End: 2021-11-26
Payer: COMMERCIAL

## 2021-11-26 DIAGNOSIS — F41.1 GAD (GENERALIZED ANXIETY DISORDER): ICD-10-CM

## 2021-11-26 DIAGNOSIS — Z87.39 HISTORY OF GOUT: ICD-10-CM

## 2021-11-26 PROBLEM — M10.072 ACUTE IDIOPATHIC GOUT OF LEFT FOOT: Status: RESOLVED | Noted: 2018-04-19 | Resolved: 2021-11-26

## 2021-11-26 PROCEDURE — 99213 OFFICE O/P EST LOW 20 MIN: CPT | Mod: 95 | Performed by: FAMILY MEDICINE

## 2021-11-26 RX ORDER — ESCITALOPRAM OXALATE 10 MG/1
10 TABLET ORAL DAILY
Qty: 90 TABLET | Refills: 1 | Status: SHIPPED | OUTPATIENT
Start: 2021-11-26 | End: 2022-06-17

## 2021-11-26 ASSESSMENT — ANXIETY QUESTIONNAIRES
5. BEING SO RESTLESS THAT IT IS HARD TO SIT STILL: NOT AT ALL
3. WORRYING TOO MUCH ABOUT DIFFERENT THINGS: NOT AT ALL
6. BECOMING EASILY ANNOYED OR IRRITABLE: SEVERAL DAYS
2. NOT BEING ABLE TO STOP OR CONTROL WORRYING: NEARLY EVERY DAY
7. FEELING AFRAID AS IF SOMETHING AWFUL MIGHT HAPPEN: NOT AT ALL
IF YOU CHECKED OFF ANY PROBLEMS ON THIS QUESTIONNAIRE, HOW DIFFICULT HAVE THESE PROBLEMS MADE IT FOR YOU TO DO YOUR WORK, TAKE CARE OF THINGS AT HOME, OR GET ALONG WITH OTHER PEOPLE: SOMEWHAT DIFFICULT
GAD7 TOTAL SCORE: 6
1. FEELING NERVOUS, ANXIOUS, OR ON EDGE: SEVERAL DAYS

## 2021-11-26 ASSESSMENT — PATIENT HEALTH QUESTIONNAIRE - PHQ9: 5. POOR APPETITE OR OVEREATING: SEVERAL DAYS

## 2021-11-26 NOTE — PROGRESS NOTES
Chucky is a 41 year old who is being evaluated via a billable video visit.      How would you like to obtain your AVS? MyChart  If the video visit is dropped, the invitation should be resent by: Text to cell phone: [phone]     Will anyone else be joining your video visit? No       Video Start Time: 12:55 PM    Assessment & Plan     KIMBERLY (generalized anxiety disorder) - improved symptoms, will continue same dose for now. Advised 1 year med recheck should be choose to continue.   - escitalopram (LEXAPRO) 10 MG tablet; Take 1 tablet (10 mg) by mouth daily    Return in about 1 year (around 11/26/2022) for Medication Recheck.    January Valenzuela MD  [facility]    Subjective   Chucky is a 41 year old who presents for the following health issues     HPI     Medication Followup of lexapro and ativan    Taking Medication as prescribed: yes    Side Effects:  Sex drive    Medication Helping Symptoms:  Not sure       Started on lexapro a month ago.   Currently taking 10 mg daily.       Review of Systems   Constitutional, HEENT, cardiovascular, pulmonary, gi and gu systems are negative, except as otherwise noted.      Objective           Vitals:  No vitals were obtained today due to virtual visit.    Physical Exam   GENERAL: Healthy, alert and no distress  EYES: Eyes grossly normal to inspection.  No discharge or erythema, or obvious scleral/conjunctival abnormalities.  RESP: No audible wheeze, cough, or visible cyanosis.  No visible retractions or increased work of breathing.    SKIN: Visible skin clear. No significant rash, abnormal pigmentation or lesions.  NEURO: Cranial nerves grossly intact.  Mentation and speech appropriate for age.  PSYCH: Mentation appears normal, affect normal/bright, judgement and insight intact, normal speech and appearance well-groomed.      KIMBERLY-7 SCORE 10/14/2021 11/26/2021   Total Score 16 (severe anxiety) -   Total Score 16 6           Video-Visit Details    Type of  service:  Video Visit    Video End Time:1:01 PM    Originating Location (pt. Location): Home    Distant Location (provider location):  [facility]     Platform used for Video Visit: Harinder

## 2021-11-27 ASSESSMENT — ANXIETY QUESTIONNAIRES: GAD7 TOTAL SCORE: 6

## 2021-11-27 ASSESSMENT — PATIENT HEALTH QUESTIONNAIRE - PHQ9: SUM OF ALL RESPONSES TO PHQ QUESTIONS 1-9: 5

## 2022-01-18 ENCOUNTER — VIRTUAL VISIT (OUTPATIENT)
Dept: ONCOLOGY | Facility: CLINIC | Age: 42
End: 2022-01-18
Attending: STUDENT IN AN ORGANIZED HEALTH CARE EDUCATION/TRAINING PROGRAM
Payer: COMMERCIAL

## 2022-01-18 DIAGNOSIS — Z80.51 FAMILY HISTORY OF CANCER OF THE KIDNEY: ICD-10-CM

## 2022-01-18 PROCEDURE — 96040 HC GENETIC COUNSELING, EACH 30 MINUTES: CPT | Mod: GT,95 | Performed by: GENETIC COUNSELOR, MS

## 2022-01-18 NOTE — LETTER
1/18/2022         RE: Kvng Peace  [address] [address]        Dear Colleague,    Thank you for referring your patient, Kvng Peace, to the [facility]. Please see a copy of my visit note below.    1/18/2022    Referring Provider: Rolando Jack MD    Presenting Information:   I met with Kvng Peace today for genetic counseling at the Cancer Risk Management Program (video visit, switched to phone visit part way through due to audio issues) to discuss his family history of kidney cancer.  He is here today to review this history, cancer screening recommendations, and available genetic testing options.    Personal History:  Kvng is a 41 year old male. He does not have any personal history of cancer. He was seen by Dr. Jack in Urology to discuss screening due to his family history of renal neoplasms; urinalysis was completed 9/3/2021, and a renal ultrasound was ordered by Dr. Jack 10/20/2021 (yet to be completed).     Family History: (Please see scanned pedigree for detailed family history information)    Chucky has a 10 year old son    His brother was recently diagnosed with kidney cancer at age 53    His father was diagnosed with metastatic kidney/ureter cancer at age 63 and passed away at age 64    His maternal grandmother was diagnosed with colon cancer in her 90's    His maternal grandfather passed away in his 70's and had a history of lung cancer and smoking    His ethnicity is Mohawk/Polish.  There is no known Ashkenazi Gnosticism ancestry on either side of his family.    Discussion:    Kvng's family history of two first degree relatives diagnosed with kidney/ureter cancer is suggestive of a possible hereditary cancer syndrome.      We reviewed the features of sporadic, familial, and hereditary cancers. There are several well described hereditary renal cell carcinoma and urinary tract cancer syndromes and genes related to increased cancer risk,  including  Costello syndrome (MLH1, MSH2, MSH6, PMS2, EPCAM), Hereditary Leiomyomatosis and Renal Cell Cancer (FH), Jlph-Ehbu-Cfed (FLCN), Cowden syndrome (PTEN), Li Fraumeni syndrome (TP53), Hereditary Paraganglioma and Pheochromocytoma syndrome (SDHA, SDHB, SDHC, SDHD), Tuberous sclerosis complex (TSC1, TSC2), and Von Hippel-Lindau disease (VHL). Additional genes associated with kidney cancer risk include BAP1, MET, and MITF.     Topics included: inheritance pattern, cancer risks, cancer screening recommendations, and also risks, benefits and limitations of testing.    We discussed that genetic testing for kidney cancer susceptibility genes is typically most informative, though, when it is first performed on a family member with a personal history of kidney cancer. In Chucky's family, his brother would be the best person to test first. Testing is available to Kvng, but with limitations. If Kvng pursues testing at this time and receives a negative result, this does not rule out the possibility of a hereditary cancer syndrome in him and/or his family. Chucky plans to follow up with his brother to determine whether genetic testing was completed, and will contact me with these updates.    Chucky likely remains at increased risk for renal cancer due to his family history, and is encouraged to follow up with his urologist. While several screening modalities for renal cancer exist (e.g., urinalysis, ultrasound), clear screening recommendations/strategies are not clearly defined for individuals with family history of renal cancer. Chucky is encouraged to discuss with his urologist and primary care provider this family history, signs and symptoms of renal cancer, and possible screening options, as they may have individualized recommendations.  These recommendations may change should genetic testing be completed.      Plan:  Chucky's brother was recently diagnosed with kidney cancer and may have completed genetic testing.  Chucky plans to  talk with his brother regarding any genetic testing which may have been done, and will follow up with this information. This history may change genetic testing and cancer screening recommendations for Chucky and his family.       Face to face time: 30 minutes          Again, thank you for allowing me to participate in the care of your patient.      Sincerely,    Tara Gary GC

## 2022-01-18 NOTE — PATIENT INSTRUCTIONS
Assessing Cancer Risk  Only about 5-10% of cancers are thought to be due to an inherited cancer susceptibility gene.    These families often have:    Several people with the same or related types of cancer    Cancers diagnosed at a young age (before age 50)    Individuals with more than one primary cancer    Multiple generations of the family affected with cancer    Genetic Testing  Genetic testing involves a simple blood test and will look at the genetic information in select genes for any harmful mutations that are associated with increased cancer risk.  If possible, it is recommended that the person(s) who has had cancer be tested before other family members.  That person will give us the most useful information about whether or not a specific gene is associated with the cancer in the family.     Results  There are three possible results of genetic testing:    Positive--a harmful mutation was identified    Negative--no mutation was identified    Variant of unknown significance--a variation in one of the genes was identified, but it is unclear how this impacts cancer risk in the family    Advantages and Disadvantages  There are advantages and disadvantages to genetic testing of these genes.    Advantages    May clarify your cancer risk    Can help you make medical decisions    May explain the cancers in your family    May give useful information to your family members (if you share your results)    Disadvantages    Possible negative emotional impact of learning about inherited cancer risk    Uncertainty in interpreting a negative test result in some situations    Possible genetic discrimination concerns (see below)    Inheritance   Mutations in most cancer risk genes are inherited in an autosomal dominant pattern.  This means that if a parent has a mutation, each of his or her children will have a 50% chance of inheriting that same mutation.  Therefore, each child--male or female--would have a 50% chance  of being at increased risk for developing cancer.                                              Image obtained from Genetics Home Reference, 2013     Genetic Information Nondiscrimination Act (YANIQUE)  YANIQUE is a federal law that protects individuals from health insurance or employment discrimination based on a genetic test result alone.  Although rare, there are currently no legal protections in terms of life insurance, long term care, or disability insurances.  Visit the National Human Genome Research Cabot at Genome.gov/93245855 to learn more.    Reducing Cancer Risk  If a harmful mutation is found in a cancer risk gene, there may be certain screens or preventative surgeries that can be offered. This information will be discussed after genetic testing is completed. If no mutations are found on genetic testing, screening is then recommended based on personal and/or family history of cancer.     Questions to Think About Regarding Genetic Testing    What effect will the test result have on me and my relationship with my family members if I have an inherited gene mutation?  If I don t have a gene mutation?    Should I share my test results, and how will my family react to this news, which may also affect them?    Are my children ready to learn new information that may one day affect their own health?    Resources  American Cancer Society (ACS) cancer.org   National Cancer Cabot (NCI) cancer.gov     Please call us if you have any questions or concerns.   Cancer Risk Management Program [phone] ([phone])  ? Deo Stuart, MS, [facility] [phone]  ? Trinity Yu, MS, [facility]  [phone]  ? Tara Gary, MS, [facility]  [phone]  ? Chantale Mejia, MS, [facility] [phone]  ? Mar Lowery, MS, [facility] [phone]  ? Rosa Alvarez, MS, [facility]  [phone]  ? Candida Rodrigez, MS  [phone]

## 2022-01-18 NOTE — PROGRESS NOTES
1/18/2022    Referring Provider: Rolando Jack MD    Presenting Information:   I met with Kvng Peace today for genetic counseling at the Cancer Risk Management Program (video visit, switched to phone visit part way through due to audio issues) to discuss his family history of kidney cancer.  He is here today to review this history, cancer screening recommendations, and available genetic testing options.    Personal History:  Kvng is a 41 year old male. He does not have any personal history of cancer. He was seen by Dr. Jack in Urology to discuss screening due to his family history of renal neoplasms; urinalysis was completed 9/3/2021, and a renal ultrasound was ordered by Dr. Jack 10/20/2021 (yet to be completed).     Family History: (Please see scanned pedigree for detailed family history information)    Chucky has a 10 year old son    His brother was recently diagnosed with kidney cancer at age 53    His father was diagnosed with metastatic kidney/ureter cancer at age 63 and passed away at age 64    His maternal grandmother was diagnosed with colon cancer in her 90's    His maternal grandfather passed away in his 70's and had a history of lung cancer and smoking    His ethnicity is Sami/Polish.  There is no known Ashkenazi Congregation ancestry on either side of his family.    Discussion:    Kvng's family history of two first degree relatives diagnosed with kidney/ureter cancer is suggestive of a possible hereditary cancer syndrome.      We reviewed the features of sporadic, familial, and hereditary cancers. There are several well described hereditary renal cell carcinoma and urinary tract cancer syndromes and genes related to increased cancer risk, including  Costello syndrome (MLH1, MSH2, MSH6, PMS2, EPCAM), Hereditary Leiomyomatosis and Renal Cell Cancer (FH), Fosj-Nlbs-Dpdu (FLCN), Cowden syndrome (PTEN), Li Fraumeni syndrome (TP53), Hereditary Paraganglioma and Pheochromocytoma syndrome (SDHA, SDHB, SDHC,  SDHD), Tuberous sclerosis complex (TSC1, TSC2), and Von Hippel-Lindau disease (VHL). Additional genes associated with kidney cancer risk include BAP1, MET, and MITF.     Topics included: inheritance pattern, cancer risks, cancer screening recommendations, and also risks, benefits and limitations of testing.    We discussed that genetic testing for kidney cancer susceptibility genes is typically most informative, though, when it is first performed on a family member with a personal history of kidney cancer. In Chucky's family, his brother would be the best person to test first. Testing is available to Kvng, but with limitations. If Kvng pursues testing at this time and receives a negative result, this does not rule out the possibility of a hereditary cancer syndrome in him and/or his family. Chucky plans to follow up with his brother to determine whether genetic testing was completed, and will contact me with these updates.    Chucky likely remains at increased risk for renal cancer due to his family history, and is encouraged to follow up with his urologist. While several screening modalities for renal cancer exist (e.g., urinalysis, ultrasound), clear screening recommendations/strategies are not clearly defined for individuals with family history of renal cancer. Chucky is encouraged to discuss with his urologist and primary care provider this family history, signs and symptoms of renal cancer, and possible screening options, as they may have individualized recommendations.  These recommendations may change should genetic testing be completed.      Plan:  Chucky's brother was recently diagnosed with kidney cancer and may have completed genetic testing.  Chucky plans to talk with his brother regarding any genetic testing which may have been done, and will follow up with this information. This history may change genetic testing and cancer screening recommendations for Chucky and his family.       Face to face time: 30  minutes    Tara Gary MS, [facility]  Licensed, Certified Genetic Counselor  [facility]  Phone: [phone]

## 2022-02-17 PROBLEM — K21.9 GASTROESOPHAGEAL REFLUX DISEASE: Status: ACTIVE | Noted: 2019-07-08

## 2022-06-16 ASSESSMENT — SLEEP AND FATIGUE QUESTIONNAIRES
HOW LIKELY ARE YOU TO NOD OFF OR FALL ASLEEP IN A CAR, WHILE STOPPED FOR A FEW MINUTES IN TRAFFIC: WOULD NEVER DOZE
HOW LIKELY ARE YOU TO NOD OFF OR FALL ASLEEP WHEN YOU ARE A PASSENGER IN A CAR FOR AN HOUR WITHOUT A BREAK: WOULD NEVER DOZE
HOW LIKELY ARE YOU TO NOD OFF OR FALL ASLEEP WHILE SITTING AND TALKING TO SOMEONE: WOULD NEVER DOZE
HOW LIKELY ARE YOU TO NOD OFF OR FALL ASLEEP WHILE WATCHING TV: SLIGHT CHANCE OF DOZING
HOW LIKELY ARE YOU TO NOD OFF OR FALL ASLEEP WHILE LYING DOWN TO REST IN THE AFTERNOON WHEN CIRCUMSTANCES PERMIT: MODERATE CHANCE OF DOZING
HOW LIKELY ARE YOU TO NOD OFF OR FALL ASLEEP WHILE SITTING AND READING: SLIGHT CHANCE OF DOZING
HOW LIKELY ARE YOU TO NOD OFF OR FALL ASLEEP WHILE SITTING QUIETLY AFTER LUNCH WITHOUT ALCOHOL: WOULD NEVER DOZE
HOW LIKELY ARE YOU TO NOD OFF OR FALL ASLEEP WHILE SITTING INACTIVE IN A PUBLIC PLACE: WOULD NEVER DOZE

## 2022-06-17 ENCOUNTER — VIRTUAL VISIT (OUTPATIENT)
Dept: SLEEP MEDICINE | Facility: CLINIC | Age: 42
End: 2022-06-17
Payer: COMMERCIAL

## 2022-06-17 VITALS — BODY MASS INDEX: 28.88 KG/M2 | HEIGHT: 74 IN | WEIGHT: 225 LBS

## 2022-06-17 DIAGNOSIS — R06.81 WITNESSED APNEIC SPELLS: ICD-10-CM

## 2022-06-17 DIAGNOSIS — E66.3 OVERWEIGHT (BMI 25.0-29.9): ICD-10-CM

## 2022-06-17 DIAGNOSIS — G47.00 INSOMNIA, UNSPECIFIED TYPE: ICD-10-CM

## 2022-06-17 DIAGNOSIS — G47.9 SLEEP DISTURBANCE: Primary | ICD-10-CM

## 2022-06-17 DIAGNOSIS — R06.83 SNORING: ICD-10-CM

## 2022-06-17 PROCEDURE — 99203 OFFICE O/P NEW LOW 30 MIN: CPT | Mod: 95 | Performed by: NURSE PRACTITIONER

## 2022-06-17 NOTE — PROGRESS NOTES
"Chucky is a 42 year old who is being evaluated via a billable video visit.      How would you like to obtain your AVS? MyChart  If the video visit is dropped, the invitation should be resent by: Text to cell phone: [phone]  Will anyone else be joining your video visit? No  {If patient encounters technical issues they should call [phone] :[id number]}Coco Ya        Video-Visit Details    Video Start Time: {video visit start/end time for provider to select:969069}    Type of service:  Video Visit    Video End Time:{video visit start/end time for provider to select:991498}    Originating Location (pt. Location): {video visit patient location:109072::\"Home\"}    Distant Location (provider location):  [facility]     Platform used for Video Visit: {Virtual Visit Platforms:006325::\"[place]\"}  "

## 2022-06-17 NOTE — PATIENT INSTRUCTIONS
"      MY TREATMENT INFORMATION FOR SLEEP APNEA-  [facility]    DOCTOR : RYAN Connell CNP    Am I having a sleep study at a sleep center?  --->Due to normal delays, you will be contacted within 2-4 weeks to schedule    Am I having a home sleep study?  --->Watch the video for the device you are using:    -/drop off device-   https://www.TUTORize.com/watch?v=yGGFBdELGhk    -Disposable device sent out require phone/computer application-   https://www.TUTORize.com/watch?v=BCce_vbiwxE      Frequently asked questions:  1. What is Obstructive Sleep Apnea (VIVIENNE)? VIVIENNE is the most common type of sleep apnea. Apnea means, \"without breath.\"  Apnea is most often caused by narrowing or collapse of the upper airway as muscles relax during sleep.   Almost everyone has occasional apneas. Most people with sleep apnea have had brief interruptions at night frequently for many years.  The severity of sleep apnea is related to how frequent and severe the events are.   2. What are the consequences of VIVIENNE? Symptoms include: feeling sleepy during the day, snoring loudly, gasping or stopping of breathing, trouble sleeping, and occasionally morning headaches or heartburn at night.  Sleepiness can be serious and even increase the risk of falling asleep while driving. Other health consequences may include development of high blood pressure and other cardiovascular disease in persons who are susceptible. Untreated VIVIENNE  can contribute to heart disease, stroke and diabetes.   3. What are the treatment options? In most situations, sleep apnea is a lifelong disease that must be managed with daily therapy. Medications are not effective for sleep apnea and surgery is generally not considered until other therapies have been tried. Your treatment is your choice . Continuous Positive Airway (CPAP) works right away and is the therapy that is effective in nearly everyone. An oral device to hold your jaw forward is usually the next most " reliable option. Other options include postioning devices (to keep you off your back), weight loss, and surgery including a tongue pacing device. There is more detail about some of these options below.  4. Are my sleep studies covered by insurance? Although we will request verification of coverage, we advise you also check in advance of the study to ensure there is coverage.    Important tips for those choosing CPAP and similar devices   Know your equipment:  CPAP is continuous positive airway pressure that prevents obstructive sleep apnea by keeping the throat from collapsing while you are sleeping. In most cases, the device is  smart  and can slowly self-adjusts if your throat collapses and keeps a record every day of how well you are treated-this information is available to you and your care team.  BPAP is bilevel positive airway pressure that keeps your throat open and also assists each breath with a pressure boost to maintain adequate breathing.  Special kinds of BPAP are used in patients who have inadequate breathing from lung or heart disease. In most cases, the device is  smart  and can slowly self-adjusts to assist breathing. Like CPAP, the device keeps a record of how well you are treated.  Your mask is your connection to the device. You get to choose what feels most comfortable and the staff will help to make sure if fits. Here: are some examples of the different masks that are available:       Key points to remember on your journey with sleep apnea:  Sleep study.  PAP devices often need to be adjusted during a sleep study to show that they are effective and adjusted right.  Good tips to remember: Try wearing just the mask during a quiet time during the day so your body adapts to wearing it. A humidifier is recommended for comfort in most cases to prevent drying of your nose and throat. Allergy medication from your provider may help you if you are having nasal congestion.  Getting settled-in. It takes  more than one night for most of us to get used to wearing a mask. Try wearing just the mask during a quiet time during the day so your body adapts to wearing it. A humidifier is recommended for comfort in most cases. Our team will work with you carefully on the first day and will be in contact within 4 days and again at 2 and 4 weeks for advice and remote device adjustments. Your therapy is evaluated by the device each day.   Use it every night. The more you are able to sleep naturally for 7-8 hours, the more likely you will have good sleep and to prevent health risks or symptoms from sleep apnea. Even if you use it 4 hours it helps. Occasionally all of us are unable to use a medical therapy, in sleep apnea, it is not dangerous to miss one night.   Communicate. Call our skilled team on the number provided on the first day if your visit for problems that make it difficult to wear the device. Over 2 out of 3 patients can learn to wear the device long-term with help from our team. Remember to call our team or your sleep providers if you are unable to wear the device as we may have other solutions for those who cannot adapt to mask CPAP therapy. It is recommended that you sleep your sleep provider within the first 3 months and yearly after that if you are not having problems.   Use it for your health. We encourage use of CPAP masks during daytime quiet periods to allow your face and brain to adapt to the sensation of CPAP so that it will be a more natural sensation to awaken to at night or during naps. This can be very useful during the first few weeks or months of adapting to CPAP though it does not help medically to wear CPAP during wakefulness and  should not be used as a strategy just to meet guidelines.  Take care of your equipment. Make sure you clean your mask and tubing using directions every day and that your filter and mask are replaced as recommended or if they are not working.     BESIDES CPAP, WHAT OTHER  THERAPIES ARE THERE?    Positioning Device  Positioning devices are generally used when sleep apnea is mild and only occurs on your back.This example shows a pillow that straps around the waist. It may be appropriate for those whose sleep study shows milder sleep apnea that occurs primarily when lying flat on one's back. Preliminary studies have shown benefit but effectiveness at home may need to be verified by a home sleep test. These devices are generally not covered by medical insurance.  Examples of devices that maintain sleeping on the back to prevent snoring and mild sleep apnea.    Belt type body positioner  http://[employer]SMITH (formerly [employer])/    Electronic reminder  http://nightshifttherapy.com/  http://www.[employer].SMITH ([employer]au/      Oral Appliance  What is oral appliance therapy?  An oral appliance device fits on your teeth at night like a retainer used after having braces. The device is made by a specialized dentist and requires several visits over 1-2 months before a manufactured device is made to fit your teeth and is adjusted to prevent your sleep apnea. Once an oral device is working properly, snoring should be improved. A home sleep test may be recommended at that time if to determine whether the sleep apnea is adequately treated.       Some things to remember:  -Oral devices are often, but not always, covered by your medical insurance. Be sure to check with your insurance provider.   -If you are referred for oral therapy, you will be given a list of specialized dentists to consider or you may choose to visit the Web site of the American Academy of Dental Sleep Medicine  -Oral devices are less likely to work if you have severe sleep apnea or are extremely overweight.     More detailed information  An oral appliance is a small acrylic device that fits over the upper and lower teeth  (similar to a retainer or a mouth guard). This device slightly moves jaw forward, which moves the base of the tongue forward, opens the  airway, improves breathing for effective treat snoring and obstructive sleep apnea in perhaps 7 out of 10 people .  The best working devices are custom-made by a dental device  after a mold is made of the teeth 1, 2, 3.  When is an oral appliance indicated?  Oral appliance therapy is recommended as a first-line treatment for patients with primary snoring, mild sleep apnea, and for patients with moderate sleep apnea who prefer appliance therapy to use of CPAP4, 5. Severity of sleep apnea is determined by sleep testing and is based on the number of respiratory events per hour of sleep.   How successful is oral appliance therapy?  The success rate of oral appliance therapy in patients with mild sleep apnea is 75-80% while in patients with moderate sleep apnea it is 50-70%. The chance of success in patients with severe sleep apnea is 40-50%. The research also shows that oral appliances have a beneficial effect on the cardiovascular health of VIVIENNE patients at the same magnitude as CPAP therapy7.  Oral appliances should be a second-line treatment in cases of severe sleep apnea, but if not completely successful then a combination therapy utilizing CPAP plus oral appliance therapy may be effective. Oral appliances tend to be effective in a broad range of patients although studies show that the patients who have the highest success are females, younger patients, those with milder disease, and less severe obesity. 3, 6.   Finding a dentist that practices dental sleep medicine  Specific training is available through the American Academy of Dental Sleep Medicine for dentists interested in working in the field of sleep. To find a dentist who is educated in the field of sleep and the use of oral appliances, near you, visit the Web site of the American Academy of Dental Sleep Medicine.    References  1. roxanna Hilliard al. Objectively measured vs self-reported compliance during oral appliance therapy for sleep-disordered  breathing. Chest 2013; 144(5): 0693-3044.  2. Young, et al. Objective measurement of compliance during oral appliance therapy for sleep-disordered breathing. Thorax 2013; 68(1): 91-96.  3. Anabela et al. Mandibular advancement devices in 620 men and women with VIVIENNE and snoring: tolerability and predictors of treatment success. Chest 2004; 125: 2948-6527.  4. Chapito et al. Oral appliances for snoring and VIVIENNE: a review. Sleep 2006; 29: 244-262.  5. Radha et al. Oral appliance treatment for VIVIENNE: an update. J Clin Sleep Med 2014; 10(2): 215-227.  6. Reyes et al. Predictors of OSAH treatment outcome. J Dent Res 2007; 86: 7587-2416.      Weight Loss:    Weight loss is a long-term strategy that may improve sleep apnea in some patients.    Weight management is a personal decision and the decision should be based on your interest and the potential benefits.  If you are interested in exploring weight loss strategies, the following discussion covers the impact on weight loss on sleep apnea and the approaches that may be successful.    Being overweight does not necessarily mean you will have health consequences.  Those who have BMI over 35 or over 27 with existing medical conditions carries greater risk.   Weight loss decreases severity of sleep apnea in most people with obesity. For those with mild obesity who have developed snoring with weight gain, even 15-30 pound weight loss can improve and occasionally eliminate sleep apnea.  Structured and life-long dietary and health habits are necessary to lose weight and keep healthier weight levels.     Though there may be significant health benefits from weight loss, long-term weight loss is very difficult to achieve- studies show success with dietary management in less than 10% of people. In addition, substantial weight loss may require years of dietary control and may be difficult if patients have severe obesity. In these cases, surgical management may be  considered.  Finally, older individuals who have tolerated obesity without health complications may be less likely to benefit from weight loss strategies.      Your BMI is Body mass index is 28.89 kg/m .    What is BMI?  Body mass index (BMI) is one way to tell whether you are at a healthy weight, overweight, or obese. It measures your weight in relation to your height.  A BMI of 18.5 to 24.9 is in the healthy range. A person with a BMI of 25 to 29.9 is considered overweight, and someone with a BMI of 30 or greater is considered obese.  Another way to find out if you are at risk for health problems caused by overweight and obesity is to measure your waist. If you are a woman and your waist is more than 35 inches, or if you are a man and your waist is more than 40 inches, your risk of disease may be higher.  More than two-thirds of American adults are considered overweight or obese. Being overweight or obese increases the risk for further weight gain.  Excess weight may lead to heart disease and diabetes. Creating and following plans for healthy eating and physical activity may help you improve your health.    Methods for maintaining or losing weight.  Weight control is part of healthy lifestyle and includes exercise, emotional health, and healthy eating habits.  Careful eating habits lifelong is the mainstay of weight control.  Though there are significant health benefits from weight loss, long-term weight loss with diet alone may be very difficult to achieve- studies show long-term success with dietary management in less than 10% of people. Attaining a healthy weight may be especially difficult to achieve in those with severe obesity. In some cases, medications, devices and surgical management might be considered.    What can you do?  If you are overweight or obese and are interested in methods for weight loss, you should discuss this with your provider. In addition, we recommend that you review healthy life styles  and methods for weight loss available through the National Institutes of Health patient information sites:   http://win.niddk.nih.gov/publications/index.htm    Surgery:    Surgery for obstructive sleep apnea is considered generally only when other therapies fail to work. Surgery may be discussed with you if you are having a difficult time tolerating CPAP and or when there is an abnormal structure that requires surgical correction.  Nose and throat surgeries often enlarge the airway to prevent collapse.  Most of these surgeries create pain for 1-2 weeks and up to half of the most common surgeries are not effective throughout life.  You should carefully discuss the benefits and drawbacks to surgery with your sleep provider and surgeon to determine if it is the best solution for you.   More information  Surgery for VIVIENNE is directed at areas that are responsible for narrowing or complete obstruction of the airway during sleep.  There are a wide range of procedures available to enlarge and/or stabilize the airway to prevent blockage of breathing in the three major areas where it can occur: the palate, tongue, and nasal regions.  Successful surgical treatment depends on the accurate identification of the factors responsible for obstructive sleep apnea in each person.  A personalized approach is required because there is no single treatment that works well for everyone.  Because of anatomic variation, consultation with an examination by a sleep surgeon is a critical first step in determining what surgical options are best for each patient.  In some cases, examination during sedation may be recommended in order to guide the selection of procedures.  Patients will be counseled about risks and benefits as well as the typical recovery course after surgery. Surgery is typically not a cure for a person s VIVIENNE.  However, surgery will often significantly improve one s VIVIENNE severity (termed  success rate ).  Even in the absence of a  cure, surgery will decrease the cardiovascular risk associated with OSA7; improve overall quality of life8 (sleepiness, functionality, sleep quality, etc).      Palate Procedures:  Patients with VIVIENNE often have narrowing of their airway in the region of their tonsils and uvula.  The goals of palate procedures are to widen the airway in this region as well as to help the tissues resist collapse.  Modern palate procedure techniques focus on tissue conservation and soft tissue rearrangement, rather than tissue removal.  Often the uvula is preserved in this procedure. Residual sleep apnea is common in patient after pharyngoplasty with an average reduction in sleep apnea events of 33%2.      Tongue Procedures:  ExamWhile patients are awake, the muscles that surround the throat are active and keep this region open for breathing. These muscles relax during sleep, allowing the tongue and other structures to collapse and block breathing.  There are several different tongue procedures available.  Selection of a tongue base procedure depends on characteristics seen on physical exam.  Generally, procedures are aimed at removing bulky tissues in this area or preventing the back of the tongue from falling back during sleep.  Success rates for tongue surgery range from 50-62%3.    Hypoglossal Nerve Stimulation:  Hypoglossal nerve stimulation has recently received approval from the [place] Food and Drug Administration for the treatment of obstructive sleep apnea.  This is based on research showing that the system was safe and effective in treating sleep apnea6.  Results showed that the median AHI score decreased 68%, from 29.3 to 9.0. This therapy uses an implant system that senses breathing patterns and delivers mild stimulation to airway muscles, which keeps the airway open during sleep.  The system consists of three fully implanted components: a small generator (similar in size to a pacemaker), a breathing sensor, and a  stimulation lead.  Using a small handheld remote, a patient turns the therapy on before bed and off upon awakening.    Candidates for this device must be greater than 22 years of age, have moderate to severe VIVIENNE (AHI between 20-65), BMI less than 32, have tried CPAP/oral appliance without success, and have appropriate upper airway anatomy (determined by a sleep endoscopy performed by Dr. King).    Hypoglossal Nerve Stimulation Pathway:    The sleep surgeon s office will work with the patient through the insurance prior-authorization process (including communications and appeals).    Nasal Procedures:  Nasal obstruction can interfere with nasal breathing during the day and night.  Studies have shown that relief of nasal obstruction can improve the ability of some patients to tolerate positive airway pressure therapy for obstructive sleep apnea1.  Treatment options include medications such as nasal saline, topical corticosteroid and antihistamine sprays, and oral medications such as antihistamines or decongestants. Non-surgical treatments can include external nasal dilators for selected patients. If these are not successful by themselves, surgery can improve the nasal airway either alone or in combination with these other options.      Combination Procedures:  Combination of surgical procedures and other treatments may be recommended, particularly if patients have more than one area of narrowing or persistent positional disease.  The success rate of combination surgery ranges from 66-80%2,3.    References  Niki AKINS. The Role of the Nose in Snoring and Obstructive Sleep Apnoea: An Update.  Eur Arch Otorhinolaryngol. 2011; 268: 1365-73.   Ryan SM; Rosa Maria JA; Shey JR; Pallanch JF; Lady MB; Letha SG; Stacy HANSEN. Surgical modifications of the upper airway for obstructive sleep apnea in adults: a systematic review and meta-analysis. SLEEP 2010;33(10):5881-5225. Anu CHU. Hypopharyngeal surgery in obstructive  sleep apnea: an evidence-based medicine review.  Arch Otolaryngol Head Neck Surg. 2006 Feb;132(2):206-13.  Doroteo YH1, Christiano Y, Ad GENNA. The efficacy of anatomically based multilevel surgery for obstructive sleep apnea. Otolaryngol Head Neck Surg. 2003 Oct;129(4):327-35.  Anu CHU, Goldberg A. Hypopharyngeal Surgery in Obstructive Sleep Apnea: An Evidence-Based Medicine Review. Arch Otolaryngol Head Neck Surg. 2006 Feb;132(2):206-13.  Rica HALEY et al. Upper-Airway Stimulation for Obstructive Sleep Apnea.  N Engl J Med. 2014 Jan 9;370(2):139-49.  Timothy Y et al. Increased Incidence of Cardiovascular Disease in Middle-aged Men with Obstructive Sleep Apnea. Am J Respir Crit Care Med; 2002 166: 159-165  Barnettjessica QUINN et al. Studying Life Effects and Effectiveness of Palatopharyngoplasty (SLEEP) study: Subjective Outcomes of Isolated Uvulopalatopharyngoplasty. Otolaryngol Head Neck Surg. 2011; 144: 623-631.        WHAT IF I ONLY HAVE SNORING?    Mandibular advancement devices, lateral sleep positioning, long-term weight loss and treatment of nasal allergies have been shown to improve snoring.  Exercising tongue muscles with a game (https://www.ncbi.nlm.nih.gov/pubmed/43417999) or stimulating the tongue during the day with a device (https://doi.org/10.3390/hkm29020915) have improved snoring in some individuals.    Remember to Drive Safe... Drive Alive     Sleep health profoundly affects your health, mood, and your safety.  Thirty three percent of the population (one in three of us) is not getting enough sleep and many have a sleep disorder. Not getting enough sleep or having an untreated / undertreated sleep condition may make us sleepy without even knowing it. In fact, our driving could be dramatically impaired due to our sleep health. As your provider, here are some things I would like you to know about driving:     Here are some warning signs for impairment and dangerous drowsy driving:              -Having been awake  more than 16 hours               -Looking tired               -Eyelid drooping              -Head nodding (it could be too late at this point)              -Driving for more than 30 minutes     Some things you could do to make the driving safer if you are experiencing some drowsiness:              -Stop driving and rest              -Call for transportation              -Make sure your sleep disorder is adequately treated     Some things that have been shown NOT to work when experiencing drowsiness while driving:              -Turning on the radio              -Opening windows              -Eating any  distracting  /  entertaining  foods (e.g., sunflower seeds, candy, or any other)              -Talking on the phone      Your decision may not only impact your life, but also the life of others. Please, remember to drive safe for yourself and all of us.
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
## 2022-10-05 ENCOUNTER — OFFICE VISIT (OUTPATIENT)
Dept: SLEEP MEDICINE | Facility: CLINIC | Age: 42
End: 2022-10-05
Attending: NURSE PRACTITIONER
Payer: COMMERCIAL

## 2022-10-05 DIAGNOSIS — E66.3 OVERWEIGHT (BMI 25.0-29.9): ICD-10-CM

## 2022-10-05 DIAGNOSIS — R06.83 SNORING: ICD-10-CM

## 2022-10-05 DIAGNOSIS — G47.9 SLEEP DISTURBANCE: ICD-10-CM

## 2022-10-05 DIAGNOSIS — G47.00 INSOMNIA, UNSPECIFIED TYPE: ICD-10-CM

## 2022-10-05 DIAGNOSIS — R06.81 WITNESSED APNEIC SPELLS: ICD-10-CM

## 2022-10-05 PROCEDURE — G0399 HOME SLEEP TEST/TYPE 3 PORTA: HCPCS | Performed by: INTERNAL MEDICINE

## 2022-10-06 ENCOUNTER — DOCUMENTATION ONLY (OUTPATIENT)
Dept: SLEEP MEDICINE | Facility: CLINIC | Age: 42
End: 2022-10-06

## 2022-10-06 NOTE — PROGRESS NOTES
This HSAT was performed using a Noxturnal T3 device which recorded snore, sound, movement activity, body position, nasal pressure, oronasal thermal airflow, pulse, oximetry and both chest and abdominal respiratory effort. HSAT data was restricted to the time patient states they were in bed.     HSAT was scored using 1B 4% hypopnea rule.     HST AHI (Non-PAT): 10.1  Snoring was reported as mild, moderate and loud.  Time with SpO2 below 89% was 13.9 minutes.   Overall signal quality was good.     Pt will follow up with sleep provider to determine appropriate therapy.       HST POST-STUDY QUESTIONNAIRE    1. What time did you go to bed?  11 p.m.  2. How long do you think it took to fall asleep?  1 - 5 minutes  3. What time did you wake up to start the day?  6:15 a.m.  4. Did you get up during the night at all?  No  5. If you woke up, do you remember approximately what time(s)? N/A  6. Did you have any difficulty with the equipment?  No  7. Did you us any type of treatment with this study?  None  8. Was the head of the bed elevated? No  9. Did you sleep in a recliner?  No  10. Did you stop using CPAP at least 3 days before this test?    11. Any other information you'd like us to know? No

## 2022-10-07 NOTE — PROCEDURES
"HOME SLEEP STUDY INTERPRETATION        Patient: Kvng Peace  MRN: [id number]  YOB: 1980  Study Date: 10/5/2022  PCP/Referring Provider: No Ref-Primary, Physician;   Ordering Provider: RYAN Connell CNP         Indications for Home Study: Kvng Peace is a 42 year old male  who presents with symptoms suggestive of obstructive sleep apnea.    Estimated body mass index is 28.89 kg/m  as calculated from the following:    Height as of 22: 1.88 m (6' 2\").    Weight as of 22: 102.1 kg (225 lb).  Total score - [place]: 4 (2022 10:03 AM)  STOP-BAN/8        Data: A full night home sleep study was performed recording the standard physiologic parameters including body position, movement, sound, nasal pressure, thermal oral airflow, chest and abdominal movements with respiratory inductance plethysmography, and oxygen saturation by pulse oximetry. Pulse rate was estimated by oximetry recording. This study was considered adequate based on > 4 hours of quality oximetry and respiratory recording. As specified by the AASM Manual for the Scoring of Sleep and Associated events, version 2.3, Rule VIII.D 1B, 4% oxygen desaturation scoring for hypopneas is used as a standard of care on all home sleep apnea testing.        Analysis Time:  439 minutes        Respiration:   Sleep Associated Hypoxemia: sustained hypoxemia was present. Baseline oxygen saturation was 95%.  Time with saturation less than or equal to 88% was 13.9 minutes. The lowest oxygen saturation was 76%.   Snoring: Snoring was present.  Respiratory events: The home study revealed a presence of 24 obstructive apneas and 0 mixed and central apneas. There were 50 hypopneas resulting in a combined apnea/hypopnea index [AHI] of 10.1 events per hour.  AHI was 24.1 per hour supine, N/A per hour prone, 7 per hour on left side, and 4 per hour on right side.   Pattern: Excluding events noted above, respiratory rate and pattern was " Normal.      Position: Percent of time spent: supine - 23.8%, prone - 0%, on left - 45%, on right - 31%.      Heart Rate: By pulse oximetry normal rate was noted.       Assessment:     Mild obstructive sleep apnea.    Sleep apnea events were predominantly supine position dependent.    Sleep associated hypoxemia was present.    Recommendations:    Depending on clinic indications for treatment of mild obstructive sleep apnea, consider following therapy options.      Patient can consider oral appliance therapy through referral to dental sleep medicine.      If there is excessive daytime sleepiness, consider auto titrating CPAP therapy.      Suggest optimizing sleep hygiene and avoiding sleep deprivation.    Weight management.        Diagnosis Code(s): Obstructive Sleep Apnea G47.33, Hypoxemia G47.36    Cameron March MD, October 7, 2022   Diplomate, American Board of Psychiatry and Neurology, Sleep Medicine

## 2022-10-21 ASSESSMENT — SLEEP AND FATIGUE QUESTIONNAIRES
HOW LIKELY ARE YOU TO NOD OFF OR FALL ASLEEP WHILE SITTING QUIETLY AFTER LUNCH WITHOUT ALCOHOL: WOULD NEVER DOZE
HOW LIKELY ARE YOU TO NOD OFF OR FALL ASLEEP WHILE SITTING AND TALKING TO SOMEONE: WOULD NEVER DOZE
HOW LIKELY ARE YOU TO NOD OFF OR FALL ASLEEP WHILE SITTING INACTIVE IN A PUBLIC PLACE: SLIGHT CHANCE OF DOZING
HOW LIKELY ARE YOU TO NOD OFF OR FALL ASLEEP WHILE SITTING AND READING: MODERATE CHANCE OF DOZING
HOW LIKELY ARE YOU TO NOD OFF OR FALL ASLEEP WHILE LYING DOWN TO REST IN THE AFTERNOON WHEN CIRCUMSTANCES PERMIT: MODERATE CHANCE OF DOZING
HOW LIKELY ARE YOU TO NOD OFF OR FALL ASLEEP IN A CAR, WHILE STOPPED FOR A FEW MINUTES IN TRAFFIC: WOULD NEVER DOZE
HOW LIKELY ARE YOU TO NOD OFF OR FALL ASLEEP WHILE WATCHING TV: WOULD NEVER DOZE
HOW LIKELY ARE YOU TO NOD OFF OR FALL ASLEEP WHEN YOU ARE A PASSENGER IN A CAR FOR AN HOUR WITHOUT A BREAK: WOULD NEVER DOZE

## 2022-10-25 ENCOUNTER — VIRTUAL VISIT (OUTPATIENT)
Dept: SLEEP MEDICINE | Facility: CLINIC | Age: 42
End: 2022-10-25
Payer: COMMERCIAL

## 2022-10-25 VITALS — WEIGHT: 225 LBS | HEIGHT: 74 IN | BODY MASS INDEX: 28.88 KG/M2

## 2022-10-25 DIAGNOSIS — G47.36 HYPOXEMIA ASSOCIATED WITH SLEEP: ICD-10-CM

## 2022-10-25 DIAGNOSIS — G47.33 OSA (OBSTRUCTIVE SLEEP APNEA): Primary | ICD-10-CM

## 2022-10-25 PROCEDURE — 99213 OFFICE O/P EST LOW 20 MIN: CPT | Mod: 95 | Performed by: NURSE PRACTITIONER

## 2022-10-25 NOTE — NURSING NOTE
A sleep dental referral order was placed for a mandibular advancement device (MAD). Information was provided with regard to positional sleep therapy as well.    Follow-up in approximately 3 months or after adequate adjustment of the MAD to discuss efficacy HST with MAD.    Sent patient copy of HST.      Gary Baeza CMA

## 2022-10-25 NOTE — PROGRESS NOTES
"Chucky is a 42 year old who is being evaluated via a billable video visit.      How would you like to obtain your AVS? MyChart  If the video visit is dropped, the invitation should be resent by: Send to e-mail at: [email or web address]  Will anyone else be joining your video visit? No        Video-Visit Details    Video Start Time: 10:16 AM    Type of service:  Video Visit    Video End Time:10:33 AM    Originating Location (pt. Location): Home        Distant Location (provider location):  On-site    Platform used for Video Visit: Reflex Systems       Home Sleep Apnea Testing Results Visit:    Chief Complaint   Patient presents with     Video Visit     HST follow-up       Kvng Peace is a 42 year old male with a PMH pertinent for anxiety and gout who returns to [facility] after having had Home Sleep Apnea Testing.  He presented with symptoms suggestive of obstructive sleep apnea.    Estimated body mass index is 28.89 kg/m  as calculated from the following:    Height as of this encounter: 1.88 m (6' 2\").    Weight as of this encounter: 102.1 kg (225 lb).  Total score - [place]: 5 (10/21/2022  9:58 AM)   Total Score: 4 (6/16/2022 10:04 AM)    Insomnia Severity Index (10/21/2022 9:57 AM)    Difficulty falling asleep 2    Difficulty staying asleep 2    Problems waking up too early 2    How SATISFIED/DISSATISFIED are you with your CURRENT sleep pattern? 3    How NOTICEABLE to others do you think your sleep problem is in terms of impairing the quality of your life? 3    How WORRIED/DISTRESSED are you about your current sleep problem? 1    To what extent do you consider your sleep problem to INTERFERE with your daily functioning (e.g. daytime fatigue, mood, ability to function at work/daily chores, concentration, memory, mood, etc.) CURRENTLY? 1    Total Score 14          Home Sleep Apnea Testing - 10/05/2022: 225 lbs 0 oz:  Respiration:   Sleep Associated Hypoxemia: sustained hypoxemia was present. " "Baseline oxygen saturation was 95%.  Time with saturation less than or equal to 88% was 13.9 minutes. The lowest oxygen saturation was 76%.   Snoring: Snoring was present. Snoring was reported as mild, moderate and loud.  Respiratory events: The home study revealed a presence of 24 obstructive apneas and 0 mixed and central apneas. There were 50 hypopneas resulting in a combined apnea/hypopnea index [AHI] of 10.1 events per hour.  AHI was 24.1 per hour supine, N/A per hour prone, 7 per hour on left side, and 4 per hour on right side.   Pattern: Excluding events noted above, respiratory rate and pattern was Normal.        Position: Percent of time spent: supine - 23.8%, prone - 0%, on left - 45%, on right - 31%.        Heart Rate: By pulse oximetry normal rate   Analysis time: 439 minutes.     Overall signal quality was Good.     Kvng Peace reports that he slept Fair .     Home Sleep Apnea Testing was reviewed in detail today with Kvng and a copy given to him for his records.    Past medical/surgical history, family history, social history, medications and allergies were reviewed.    Patient Active Problem List   Diagnosis     Gastroesophageal reflux disease, esophagitis presence not specified     History of gout     No current outpatient medications on file.     No current facility-administered medications for this visit.     Ht 1.88 m (6' 2\")   Wt 102.1 kg (225 lb)   BMI 28.89 kg/m      Impression/Plan:  Mild Obstructive Sleep Apnea.   - Sleep apnea events were predominantly supine position dependent.  Sleep associated hypoxemia  - Sleep Dental Referral; Future     Treatment options discussed today including  auto-CPAP at 5-15 cmH2O, oral appliance therapy or positional therapy.    Elected treatment with oral appliance therapy. A sleep dental referral order was placed for mandibular advancement device (MAD) to treat mild VIVIENNE.  We discussed usual risks associated with MAD therapy including orthodontic " changes such as shifting of teeth, changes in bite, and pain/discomfort in the joint of the jaw called TMJ syndrome.    Follow-up in approximately 3 months or after adequate adjustment of the MAD to discuss efficacy HST with MAD.    20 minutes spent with patient with >50% spent in counseling, chart review/documentation, and coordination of care on the date of the encounter.      RYAN Connell CNP  Sleep Medicine      CC:  No Ref-Primary, Physician,     This note was written with the assistance of the Dragon voice-dictation technology software. The final document, although reviewed, may contain errors. For corrections, please contact the office.

## 2022-10-25 NOTE — PATIENT INSTRUCTIONS
POSITIONAL SLEEP DEVICES:  Devices that maintain sleeping on the back to prevent snoring and mild sleep apnea.    Http://Win Win Slots/  https://www.ColdWatt/Sleep-Noodle-Positional-Aid-Large/dp/M51TX74R9N  https://www.Apollidon      SLEEP DENTAL PROVIDERS LIST:    [facility] [place] (Medicare)  Provider: Mario Rosenberg DDS   [address], [place] [address]  Phone: [phone]  Fax: [phone]    [facility]  Providers: Bryson Gallardo DDS, Vickie Rosas DDS, Ross Nieto DDS  Fax [phone]  Locations:   [phone]  [facility]  [address]  [facility]  [address]  [facility]  [address] [facility]  [address] [facility]   [phone]  [facility]  [address]  [address] [facility]  [place]  [address] [facility] (Medicare)  Providers: Manish Hudson DDS, FADALLIN, Rhea Sykes DDS, MS, Irene Todd DDS, Mervat Buchanan DDS  [address], [place] [address]  Phone: [phone]  Fax: [phone]    [facility]  Provider: Bernadine Haney DDS, PhD    [address] [place] [address]  Phone: [phone]  Fax: [phone]    [address] [place] [address]  Phone: [phone]  Fax: [phone]      [facility]  Provider: Osmany Pelaez DDS  Address: [address], [place] [address]  Phone: [phone]      [facility]   Provider: Conrado Murillo DDS  [facility]  [address] [place] [address]  Appointments: [phone]    [facility]   Provider: Stephen Weinstein DDS   [address]   [place] [address]   Appointments: [phone]   Fax: [phone]     [facility]   Provider: Corbin Bernardo DDS, MS  - [DME Medicare]  [facility]   [address], [address]   [place] [address]   Appointments: [phone]   Fax: [phone]     [facility]  Provider: Marco Dean DMD, MSD - [DME Medicare]  [address], [address]  [place] [address]  Appointments [phone]  Fax: [phone]    [facility]   [facility]   Provider: Zac March DDS, [facility]  [address] [place] [address] [address]  [place] [address]   Appointments: [phone]  Fax: [phone]     [facility]   Providers: Jelani Andrade DMD, Stephen Weinstein DDS   [address]   [place] [address]   Appointments: [phone]     [facility]   Providers: Owen Linares DDS, Ross Leblanc DDS  [address]   [place] [address]   Appointments: [phone]   Fax: [phone]     Provider: Meng Luna DDS  [address]  [place]  [address]  [phone]

## 2022-11-17 ENCOUNTER — TRANSFERRED RECORDS (OUTPATIENT)
Dept: HEALTH INFORMATION MANAGEMENT | Facility: CLINIC | Age: 42
End: 2022-11-17

## 2022-12-27 ENCOUNTER — TELEPHONE (OUTPATIENT)
Dept: SLEEP MEDICINE | Facility: CLINIC | Age: 42
End: 2022-12-27

## 2022-12-27 NOTE — TELEPHONE ENCOUNTER
Reason for call:  Other   Patient called regarding (reason for call): call back  Additional comments:   Information for insurance for [employer]  Regarding visits:  6/14/22 consult- denied for lack of informatin per ins request  10/5/22 in lab SS (has Dr CAROLANN March listed) - denied for lack of informatin per ins request    10/25/22 needs additional info needed  [employer] needs more information and is supposed to be the first payor per spouse. Please reach out to patient regarding this issue. Thank you       Phone number to reach patient:  Cell number on file:    Telephone Information:   Mobile [phone]       Best Time:  any    Can we leave a detailed message on this number?  YES    Travel screening: Not Applicable

## 2022-12-28 NOTE — TELEPHONE ENCOUNTER
Email sent to financial team to review.     Aditi COLLINS RN  [facility]

## 2024-05-06 PROBLEM — G47.33 OSA (OBSTRUCTIVE SLEEP APNEA): Status: ACTIVE | Noted: 2024-05-06

## 2024-05-06 PROBLEM — F41.1 GAD (GENERALIZED ANXIETY DISORDER): Chronic | Status: ACTIVE | Noted: 2024-05-06

## 2024-05-06 PROBLEM — G47.33 OSA (OBSTRUCTIVE SLEEP APNEA): Chronic | Status: ACTIVE | Noted: 2024-05-06

## 2024-05-06 PROBLEM — K21.9 GASTROESOPHAGEAL REFLUX DISEASE: Chronic | Status: ACTIVE | Noted: 2019-07-08

## 2024-05-06 PROBLEM — G47.09 OTHER INSOMNIA: Chronic | Status: ACTIVE | Noted: 2024-05-06

## 2024-05-06 ASSESSMENT — SLEEP AND FATIGUE QUESTIONNAIRES
HOW LIKELY ARE YOU TO NOD OFF OR FALL ASLEEP WHEN YOU ARE A PASSENGER IN A CAR FOR AN HOUR WITHOUT A BREAK: WOULD NEVER DOZE
HOW LIKELY ARE YOU TO NOD OFF OR FALL ASLEEP WHILE SITTING AND READING: MODERATE CHANCE OF DOZING
HOW LIKELY ARE YOU TO NOD OFF OR FALL ASLEEP WHILE SITTING AND TALKING TO SOMEONE: WOULD NEVER DOZE
HOW LIKELY ARE YOU TO NOD OFF OR FALL ASLEEP WHILE LYING DOWN TO REST IN THE AFTERNOON WHEN CIRCUMSTANCES PERMIT: SLIGHT CHANCE OF DOZING
HOW LIKELY ARE YOU TO NOD OFF OR FALL ASLEEP IN A CAR, WHILE STOPPED FOR A FEW MINUTES IN TRAFFIC: SLIGHT CHANCE OF DOZING
HOW LIKELY ARE YOU TO NOD OFF OR FALL ASLEEP WHILE WATCHING TV: SLIGHT CHANCE OF DOZING
HOW LIKELY ARE YOU TO NOD OFF OR FALL ASLEEP WHILE SITTING INACTIVE IN A PUBLIC PLACE: WOULD NEVER DOZE
HOW LIKELY ARE YOU TO NOD OFF OR FALL ASLEEP WHILE SITTING QUIETLY AFTER LUNCH WITHOUT ALCOHOL: WOULD NEVER DOZE

## 2024-05-07 ENCOUNTER — VIRTUAL VISIT (OUTPATIENT)
Dept: SLEEP MEDICINE | Facility: CLINIC | Age: 44
End: 2024-05-07
Payer: COMMERCIAL

## 2024-05-07 VITALS — BODY MASS INDEX: 28.11 KG/M2 | HEIGHT: 74 IN | WEIGHT: 219 LBS

## 2024-05-07 DIAGNOSIS — G47.33 OSA (OBSTRUCTIVE SLEEP APNEA): Primary | ICD-10-CM

## 2024-05-07 PROCEDURE — 99213 OFFICE O/P EST LOW 20 MIN: CPT | Mod: 95 | Performed by: PHYSICIAN ASSISTANT

## 2024-05-07 ASSESSMENT — PAIN SCALES - GENERAL: PAINLEVEL: NO PAIN (0)

## 2024-05-07 NOTE — PROGRESS NOTES
Sleep Apnea - Follow-up Visit:    Impression/Plan:  Mild obstructive sleep apnea, currently not treated-  He presents with loud snoring, witnessed apnea and non-refreshing sleep  We reviewed options.   He elected treatment with auto-CPAP 6-16 cm/H20  Comprehensive DME order placed.     Kvng Peace will follow up in about 3 month(s).     24 minutes spent on day of encounter doing chart review,  history and exam, counseling, coordinating plan of care, documentation and further activities as noted above.       Mane Lyman PA-C  Sleep Medicine     History of Present Illness:  Chief Complaint   Patient presents with    RECHECK    Sleep Apnea       Kvng Peace presents for follow-up of their mild sleep apnea, currently not treated.     He initially presented with snoring, witnessed apnea and daytime fatigue. Home sleep apnea testing recommended.    Home Sleep Apnea Testing - 10/05/2022: 225 lbs 0 oz:  Respiration:   Sleep Associated Hypoxemia: sustained hypoxemia was present. Baseline oxygen saturation was 95%.  Time with saturation less than or equal to 88% was 13.9 minutes. The lowest oxygen saturation was 76%.   Snoring: Snoring was present. Snoring was reported as mild, moderate and loud.  Respiratory events: The home study revealed a presence of 24 obstructive apneas and 0 mixed and central apneas. There were 50 hypopneas resulting in a combined apnea/hypopnea index [AHI] of 10.1 events per hour.  AHI was 24.1 per hour supine, N/A per hour prone, 7 per hour on left side, and 4 per hour on right side.    Elected treatment with oral appliance therapy. He did not follow up on construction of the mandibular advancement device.     Weight now is 219 lbs.     Do you use a CPAP Machine at home: No    What is your typical bedtime:  930 am  How long does it take you to go to sleep:  30 minutes  What time do you typically get out of bed for the day:  4 am  How many hours are you sleeping per night:  6 am  Do you  feel well rested in the morning:  no      EPWORTH SLEEPINESS SCALE         5/6/2024     9:02 PM    [place] Sleepiness Scale ( ADELINE Bhandari  [id number]<br>ESS - [place]/English - Final version - 21 Nov 07 - [facility] [place].)   Sitting and reading Moderate chance of dozing   Watching TV Slight chance of dozing   Sitting, inactive in a public place (e.g. a theatre or a meeting) Would never doze   As a passenger in a car for an hour without a break Would never doze   Lying down to rest in the afternoon when circumstances permit Slight chance of dozing   Sitting and talking to someone Would never doze   Sitting quietly after a lunch without alcohol Would never doze   In a car, while stopped for a few minutes in traffic Slight chance of dozing   [place] Score (MC) 5   [place] Score (Sleep) 5       INSOMNIA SEVERITY INDEX (GEOFFREY)          5/6/2024     9:01 PM   Insomnia Severity Index (GEOFFREY)   Difficulty falling asleep 1   Difficulty staying asleep 2   Problems waking up too early 0   How SATISFIED/DISSATISFIED are you with your CURRENT sleep pattern? 1   How NOTICEABLE to others do you think your sleep problem is in terms of impairing the quality of your life? 3   How WORRIED/DISTRESSED are you about your current sleep problem? 2   To what extent do you consider your sleep problem to INTERFERE with your daily functioning (e.g. daytime fatigue, mood, ability to function at work/daily chores, concentration, memory, mood, etc.) CURRENTLY? 2   GEOFFREY Total Score 11       Guidelines for Scoring/Interpretation:  Total score categories:  0-7 = No clinically significant insomnia   8-14 = Subthreshold insomnia   15-21 = Clinical insomnia (moderate severity)  22-28 = Clinical insomnia (severe)  Used via courtesy of www.[facility].va.gov with permission from Arturo Upton PhD., [facility] [place] [facility]        Past medical/surgical history, family history, social history, medications and allergies were reviewed.        Problem List:  Patient  "Active Problem List    Diagnosis Date Noted    VIVIENNE (obstructive sleep apnea)-AHI 10 05/06/2024     Priority: Medium    Other insomnia 05/06/2024     Priority: Medium    KIMBERLY (generalized anxiety disorder) 05/06/2024     Priority: Medium    History of gout 11/26/2021     Priority: Medium    Gastroesophageal reflux disease, esophagitis presence not specified 07/08/2019     Priority: Medium     IMO Regulatory Load OCT 2020          Ht 1.88 m (6' 2\")   Wt 99.3 kg (219 lb)   BMI 28.12 kg/m     "

## 2024-05-07 NOTE — NURSING NOTE
Has patient had flu shot for current/most recent flu season? If so, when? No      Is the patient currently in the state of [place]? YES    Visit mode:VIDEO    If the visit is dropped, the patient can be reconnected by: VIDEO VISIT: Text to cell phone:   Telephone Information:   Mobile [phone]       Will anyone else be joining the visit? NO  (If patient encounters technical issues they should call [phone]    How would you like to obtain your AVS? MyChart    Are changes needed to the allergy or medication list? No    Are refills needed on medications prescribed by this physician? NO    Reason for visit: RECHECK    Samira HIGGINS

## 2024-05-07 NOTE — PATIENT INSTRUCTIONS
You will be provided with an auto-titrating CPAP with a pressure range of 6-16 cm with heated humidity to limit nasal congestion. Adjust the heat level on humidifier to find a setting that prevents dry nose but does not cause condensation in the hose or mask. Use distilled water in the humidifier.  The CPAP has a ramp function that starts the pressure lower than your prescribed pressure and gradually increases it over a number of minutes.  This may make it easier to fall asleep.    Try to use the CPAP every-night, all night (minimum of 4 hours). Many insurances require that we prove you are using the CPAP at least 4 hours on at least 70% of nights over a 30 day period. We have 90 days to meet those criteria.            Discussed weight management and the impact of weight gain on sleep apnea.  Let me know if you snore or feel the pressure is too high.    You can get new supplies (mask, hose and filter) for your CPAP every 3-6 months, covered by insurance. You do not need to get supplies that often, but they are available if you would like them.  You may exchange the mask once within the first month if you feel the initial mask does not fit well.  Contact your medical equipment provider for equipment issues.  Please let me know if you have any return of snoring, daytime sleepiness or poor sleep quality. We will want to make sure your CPAP is adequately treating your apnea.  There is a website called CPAP.com that has accessories that may make CPAP use easier. Please visit it at your convenience.      [facility] [phone]     Follow-up 2 month.  Bring your CPAP machine with you to the follow up appointment.   Your Body mass index is 28.12 kg/m .  Weight management is a personal decision.  If you are interested in exploring weight loss strategies, the following discussion covers the approaches that may be successful. Body mass index (BMI) is one way to tell whether you are at a healthy weight,  overweight, or obese. It measures your weight in relation to your height.  A BMI of 18.5 to 24.9 is in the healthy range. A person with a BMI of 25 to 29.9 is considered overweight, and someone with a BMI of 30 or greater is considered obese. More than two-thirds of American adults are considered overweight or obese.  Being overweight or obese increases the risk for further weight gain. Excess weight may lead to heart disease and diabetes.  Creating and following plans for healthy eating and physical activity may help you improve your health.  Weight control is part of healthy lifestyle and includes exercise, emotional health, and healthy eating habits. Careful eating habits lifelong are the mainstay of weight control. Though there are significant health benefits from weight loss, long-term weight loss with diet alone may be very difficult to achieve- studies show long-term success with dietary management in less than 10% of people. Attaining a healthy weight may be especially difficult to achieve in those with severe obesity. In some cases, medications, devices and surgical management might be considered.  What can you do?  If you are overweight or obese and are interested in methods for weight loss, you should discuss this with your provider.   Consider reducing daily calorie intake by 500 calories.   Keep a food journal.   Avoiding skipping meals, consider cutting portions instead.    Diet combined with exercise helps maintain muscle while optimizing fat loss. Strength training is particularly important for building and maintaining muscle mass. Exercise helps reduce stress, increase energy, and improves fitness. Increasing exercise without diet control, however, may not burn enough calories to loose weight.     Start walking three days a week 10-20 minutes at a time  Work towards walking thirty minutes five days a week   Eventually, increase the speed of your walking for 1-2 minutes at time    In addition, we  recommend that you review healthy lifestyles and methods for weight loss available through the National Institutes of Health patient information sites:  http://win.niddk.nih.gov/publications/index.htm    And look into health and wellness programs that may be available through your health insurance provider, employer, local community center, or [employer] club.

## 2024-05-07 NOTE — PROGRESS NOTES
Virtual Visit Details    Type of service:  Video Visit     Originating Location (pt. Location): Home    Distant Location (provider location):  On-site  Platform used for Video Visit: Harinder

## 2024-05-17 ENCOUNTER — DOCUMENTATION ONLY (OUTPATIENT)
Dept: SLEEP MEDICINE | Facility: CLINIC | Age: 44
End: 2024-05-17
Payer: COMMERCIAL

## 2024-05-17 DIAGNOSIS — G47.01 INSOMNIA DUE TO MEDICAL CONDITION: ICD-10-CM

## 2024-05-17 DIAGNOSIS — G47.33 OSA (OBSTRUCTIVE SLEEP APNEA): Primary | Chronic | ICD-10-CM

## 2024-05-17 NOTE — PROGRESS NOTES
Patient was offered choice of vendor and chose [facility].  Patient Kvng Peace was set up at [place] on May 17, 2024. Patient received a Resmed Airsense 10 Pressures were set at  6-16 cm H2O.   Patient s ramp is 4 cm H2O for Auto and FLEX/EPR is 2.  Patient received a Resmed Mask name: AIRFIT F20  Nasal mask size Medium, heated tubing and heated humidifier.  Patient has the following compliance requirements: none.    Bronwyn Wilburn

## 2024-05-20 ENCOUNTER — DOCUMENTATION ONLY (OUTPATIENT)
Dept: SLEEP MEDICINE | Facility: CLINIC | Age: 44
End: 2024-05-20
Payer: COMMERCIAL

## 2024-05-20 DIAGNOSIS — G47.33 OSA (OBSTRUCTIVE SLEEP APNEA): Primary | Chronic | ICD-10-CM

## 2024-05-20 DIAGNOSIS — G47.09 OTHER INSOMNIA: Chronic | ICD-10-CM

## 2024-05-20 DIAGNOSIS — F41.1 GAD (GENERALIZED ANXIETY DISORDER): Chronic | ICD-10-CM

## 2024-05-20 NOTE — PROGRESS NOTES
3 day Sleep therapy management telephone visit    Diagnostic AHI:    HST: 10.1          Confirmed with patient at time of call- Yes Patient is still interested in STM service       Subjective measures:  Pt reports he is adjusting to the CPAP. His wife noticed that he was snoring with CPAP the other night. Checked his DL and he has no pressure maxing. Explained to the pt we could consider a pressure change once we have more data on his pressure needs / or the snoring may resolve on its own.  Pt was given my contact information and instructed to reach out with any questions or concerns.       Order settings:  CPAP MIN CPAP MAX   6 cm H2O 16 cm H2O       Device settings:  CPAP MIN CPAP MAX EPR RESMED SOFT RESPONSE SETTING   6.0 cm  H20 16.0 cm  H20 TWO OFF       Compliance 66 %    Assessment: Nightly usage over four hours     Action plan: Patient to have 14 day STM visit.     Patient has the following upcoming sleep appts:      Replacement device: No  STM ordered by provider: Yes     Total time spent on accessing and  interpreting remote patient PAP therapy data  10 minutes    Total time spent counseling, coaching  and reviewing PAP therapy data with patient  3 minutes    [id number] no

## 2024-06-04 ENCOUNTER — DOCUMENTATION ONLY (OUTPATIENT)
Dept: SLEEP MEDICINE | Facility: CLINIC | Age: 44
End: 2024-06-04
Payer: COMMERCIAL

## 2024-06-04 DIAGNOSIS — G47.09 OTHER INSOMNIA: Chronic | ICD-10-CM

## 2024-06-04 DIAGNOSIS — G47.33 OSA (OBSTRUCTIVE SLEEP APNEA): Primary | Chronic | ICD-10-CM

## 2024-06-04 DIAGNOSIS — F41.1 GAD (GENERALIZED ANXIETY DISORDER): Chronic | ICD-10-CM

## 2024-06-04 NOTE — PROGRESS NOTES
14  DAY STM VISIT    Diagnostic AHI:  HST: 10.9        Subjective measures: Spoke with pt. He has had struggles with condensation, mask fit, and growing intolerant of CPAP by the end of the night. Pts wife reports he is no longer snoring and is appreciative. He has his his tube temp at 68 degrees. Told him to go up to 70, will send [employer] troubleshooting guide for humidity. Will send mask fit videos. Pt feels the fit is uncomfortable. [facility] Reviewed AHI and pt is pleased.  Pt was given my contact information and instructed to reach out with any questions or concerns.       Assessment: Pt not meeting objective benchmarks for compliance  Patient failing following subjective benchmarks: not feeling benefit from therapy and mask discomfort     Action plan: pt to have 30 day STM visit.      Device type: Auto-CPAP    PAP settings:  DEVICE TYPE CPAP MIN CPAP MAX 95TH % PRESSURE EPR MASK DISPENSED   Auto-CPAP    6.0 cm  H20 16.0 cm  H20 13.3 cm  H20  TWO 2     Mask type:      Objective measures: 14 day rolling measures   COMPLIANCE LEAK AHI AVERAGE USE IN MINUTES   64 % 7.8 2.84 277   GOAL >70% GOAL < 24 LPM GOAL <5 GOAL >240      Patient has the following upcoming sleep appts:      Total time spent on accessing and interpreting remote patient PAP therapy data  10 minutes    Total time spent counseling, coaching  and reviewing PAP therapy data with patient  11 minutes    [id number]  [id number]  no (3 day STM)

## 2024-09-05 ENCOUNTER — OFFICE VISIT (OUTPATIENT)
Dept: ORTHOPEDICS | Facility: CLINIC | Age: 44
End: 2024-09-05
Payer: COMMERCIAL

## 2024-09-05 ENCOUNTER — ANCILLARY PROCEDURE (OUTPATIENT)
Dept: GENERAL RADIOLOGY | Facility: CLINIC | Age: 44
End: 2024-09-05
Attending: FAMILY MEDICINE
Payer: COMMERCIAL

## 2024-09-05 VITALS — BODY MASS INDEX: 30.3 KG/M2 | WEIGHT: 236 LBS

## 2024-09-05 DIAGNOSIS — M10.9 GOUTY ARTHRITIS OF LEFT GREAT TOE: Primary | ICD-10-CM

## 2024-09-05 DIAGNOSIS — Z87.39 HISTORY OF GOUT: ICD-10-CM

## 2024-09-05 PROCEDURE — 73630 X-RAY EXAM OF FOOT: CPT | Mod: TC | Performed by: RADIOLOGY

## 2024-09-05 PROCEDURE — 99204 OFFICE O/P NEW MOD 45 MIN: CPT | Performed by: FAMILY MEDICINE

## 2024-09-05 RX ORDER — PREDNISONE 20 MG/1
40 TABLET ORAL DAILY
Qty: 14 TABLET | Refills: 0 | Status: SHIPPED | OUTPATIENT
Start: 2024-09-05

## 2024-09-05 NOTE — LETTER
9/5/2024      Kvng Peace  [address] [facility] [facility] [id number]      Dear Colleague,    Thank you for referring your patient, Kvng Peace, to the [facility] [place]. Please see a copy of my visit note below.    CHIEF COMPLAINT:  Pain of the Left Foot     HISTORY OF PRESENT ILLNESS  Mr. Peace is a pleasant 44 year old year old male who presents to clinic today with left foot pain.  Kvng explains that he thinks he has gout.  He was previously diagnosed with a gout flare in 2018, treated with indomethacin and allopurinol.  He only took allopurinol for 1 month.    He describes the current pain began insidiously 3 weeks ago when he felt warmth, red first MTP joint of his left foot.  He had pain with anything touching his foot including sheets.  He recalls eating fish and drinking alcohol prior to this onset.  He has a family history of gout, positive for father.    Onset: rapid  Location: left foot  Quality:  stabbing and sharp  Duration: 3 weeks   Severity: 6/10 at worst  Timing: constant, 1 week   Modifying factors:  resting and non-use makes it better, movement and use makes it worse  Associated signs & symptoms: pain, swelling and tenderness  Previous similar pain: Yes, 6-7 years ago  Treatments to date: Medication for Gout (2018), Uric Acid Cleanse, Ice, Ibuprofen    Additional history: as documented    Review of Systems:  A 10-point review of systems was obtained and is negative except for as noted in the HPI.       MEDICAL HISTORY  Patient Active Problem List   Diagnosis     Gastroesophageal reflux disease, esophagitis presence not specified     History of gout     VIVIENNE (obstructive sleep apnea)-AHI 10     Other insomnia     KIMBERLY (generalized anxiety disorder)       Current Outpatient Medications   Medication Sig Dispense Refill     predniSONE (DELTASONE) 20 MG tablet Take 2 tablets (40 mg) by mouth daily. 14 tablet 0       No Known Allergies    Family History    Problem Relation Age of Onset     Diabetes Mother         T2     Cancer Father         kidney: 64:      Cancer Maternal Grandfather         lung     Diabetes Paternal Grandmother      Diabetes Paternal Grandfather        Additional medical/Social/Surgical histories reviewed in [facility] and updated as appropriate.       PHYSICAL EXAM  Wt 107 kg (236 lb)   BMI 30.30 kg/m      General Appearance: Well appearing, alert, in no acute distress, well-hydrated, and well nourished    Musculoskeletal -left foot  - stance: normal gait with limp favoring right foot  - inspection: Mild swelling and erythema of great toe MP joint,  normal bone and joint alignment, no obvious deformity  - palpation: Tenderness to palpation of 1st MTP joint of right foot circumferentially.  Warmth palpable.  - ROM: Decreased ROM and pain with flexion and extension of 1st MTP joint secondary to stiffness and pain.  Normal active and passive ROM of lesser toes, no pain with MT translation  - strength: 5/5 in all planes of ankle.   Neuro  - no sensory or motor deficit, grossly normal coordination, normal muscle tone  Skin  - no ecchymosis, erythema, warmth, or induration, no obvious rash  Psych  - interactive, appropriate, normal mood and affect     IMAGING : X-rays left foot 3 views. Final results and radiologist's interpretation, available in the [facility] health record. Images were reviewed with the patient/family members in the office today. My personal interpretation of the performed imaging is minimal degenerative changes of left first MTP joint.  Formally noted possible erosion on 2018 seems less obvious today.     ASSESSMENT & PLAN  Mr. Peace is a 44 year old year old male with past medical history of gouty arthropathy of left great toe presenting with acute pain and swelling of left great toe.    Diagnosis: Gouty arthropathy of left great toe    Treatments discussed for current gout flare.  Would like him to start prednisone over the next  week.  States 7-day course.  Additionally we discussed appropriate footwear and supportive soles.  Can use ice.  Additionally I reviewed pros and cons of intra-articular corticosteroid injection to avoid oral steroids, however we agreed to start with oral first.    I placed an order for uric acid there would like him to complete with his health maintenance labs which she will likely have next week after his well exam.  We discussed possible prophylaxis including allopurinol, however he has not had a flare of this significant since 2018 and I would recommend against prophylaxis at this point in time unless he has an additional flare this year.    He is well versed and understands the low purine diet.  He will continue to follow this.    It was a pleasure seeing Kvng today.    Michele Reilly DO, [facility]  Primary Care Sports Medicine      Again, thank you for allowing me to participate in the care of your patient.        Sincerely,        Michele Reilly DO

## 2024-09-05 NOTE — PROGRESS NOTES
CHIEF COMPLAINT:  Pain of the Left Foot     HISTORY OF PRESENT ILLNESS  Mr. Peace is a pleasant 44 year old year old male who presents to clinic today with left foot pain.  Kvng explains that he thinks he has gout.  He was previously diagnosed with a gout flare in 2018, treated with indomethacin and allopurinol.  He only took allopurinol for 1 month.    He describes the current pain began insidiously 3 weeks ago when he felt warmth, red first MTP joint of his left foot.  He had pain with anything touching his foot including sheets.  He recalls eating fish and drinking alcohol prior to this onset.  He has a family history of gout, positive for father.    Onset: rapid  Location: left foot  Quality:  stabbing and sharp  Duration: 3 weeks   Severity: 6/10 at worst  Timing: constant, 1 week   Modifying factors:  resting and non-use makes it better, movement and use makes it worse  Associated signs & symptoms: pain, swelling and tenderness  Previous similar pain: Yes, 6-7 years ago  Treatments to date: Medication for Gout (2018), Uric Acid Cleanse, Ice, Ibuprofen    Additional history: as documented    Review of Systems:  A 10-point review of systems was obtained and is negative except for as noted in the HPI.       MEDICAL HISTORY  Patient Active Problem List   Diagnosis    Gastroesophageal reflux disease, esophagitis presence not specified    History of gout    VIVIENNE (obstructive sleep apnea)-AHI 10    Other insomnia    KIMBERLY (generalized anxiety disorder)       Current Outpatient Medications   Medication Sig Dispense Refill    predniSONE (DELTASONE) 20 MG tablet Take 2 tablets (40 mg) by mouth daily. 14 tablet 0       No Known Allergies    Family History   Problem Relation Age of Onset    Diabetes Mother         T2    Cancer Father         kidney: 64:     Cancer Maternal Grandfather         lung    Diabetes Paternal Grandmother     Diabetes Paternal Grandfather        Additional medical/Social/Surgical  histories reviewed in [facility] and updated as appropriate.       PHYSICAL EXAM  Wt 107 kg (236 lb)   BMI 30.30 kg/m      General Appearance: Well appearing, alert, in no acute distress, well-hydrated, and well nourished    Musculoskeletal -left foot  - stance: normal gait with limp favoring right foot  - inspection: Mild swelling and erythema of great toe MP joint,  normal bone and joint alignment, no obvious deformity  - palpation: Tenderness to palpation of 1st MTP joint of right foot circumferentially.  Warmth palpable.  - ROM: Decreased ROM and pain with flexion and extension of 1st MTP joint secondary to stiffness and pain.  Normal active and passive ROM of lesser toes, no pain with MT translation  - strength: 5/5 in all planes of ankle.   Neuro  - no sensory or motor deficit, grossly normal coordination, normal muscle tone  Skin  - no ecchymosis, erythema, warmth, or induration, no obvious rash  Psych  - interactive, appropriate, normal mood and affect     IMAGING : X-rays left foot 3 views. Final results and radiologist's interpretation, available in the [facility] health record. Images were reviewed with the patient/family members in the office today. My personal interpretation of the performed imaging is minimal degenerative changes of left first MTP joint.  Formally noted possible erosion on 2018 seems less obvious today.     ASSESSMENT & PLAN  Mr. Peace is a 44 year old year old male with past medical history of gouty arthropathy of left great toe presenting with acute pain and swelling of left great toe.    Diagnosis: Gouty arthropathy of left great toe    Treatments discussed for current gout flare.  Would like him to start prednisone over the next week.  States 7-day course.  Additionally we discussed appropriate footwear and supportive soles.  Can use ice.  Additionally I reviewed pros and cons of intra-articular corticosteroid injection to avoid oral steroids, however we agreed to start with oral  first.    I placed an order for uric acid there would like him to complete with his health maintenance labs which she will likely have next week after his well exam.  We discussed possible prophylaxis including allopurinol, however he has not had a flare of this significant since 2018 and I would recommend against prophylaxis at this point in time unless he has an additional flare this year.    He is well versed and understands the low purine diet.  He will continue to follow this.    It was a pleasure seeing Kvng today.    Michele Reilly DO, CAQSM  Primary Care Sports Medicine

## 2024-10-09 SDOH — HEALTH STABILITY: PHYSICAL HEALTH: ON AVERAGE, HOW MANY DAYS PER WEEK DO YOU ENGAGE IN MODERATE TO STRENUOUS EXERCISE (LIKE A BRISK WALK)?: 6 DAYS

## 2024-10-09 SDOH — HEALTH STABILITY: PHYSICAL HEALTH: ON AVERAGE, HOW MANY MINUTES DO YOU ENGAGE IN EXERCISE AT THIS LEVEL?: 80 MIN

## 2024-10-09 ASSESSMENT — SOCIAL DETERMINANTS OF HEALTH (SDOH): HOW OFTEN DO YOU GET TOGETHER WITH FRIENDS OR RELATIVES?: ONCE A WEEK

## 2024-10-10 ENCOUNTER — OFFICE VISIT (OUTPATIENT)
Dept: INTERNAL MEDICINE | Facility: CLINIC | Age: 44
End: 2024-10-10
Payer: COMMERCIAL

## 2024-10-10 VITALS
SYSTOLIC BLOOD PRESSURE: 137 MMHG | DIASTOLIC BLOOD PRESSURE: 83 MMHG | HEIGHT: 74 IN | WEIGHT: 232.9 LBS | HEART RATE: 86 BPM | TEMPERATURE: 97.1 F | RESPIRATION RATE: 14 BRPM | BODY MASS INDEX: 29.89 KG/M2 | OXYGEN SATURATION: 97 %

## 2024-10-10 DIAGNOSIS — Z11.4 SCREENING FOR HIV (HUMAN IMMUNODEFICIENCY VIRUS): ICD-10-CM

## 2024-10-10 DIAGNOSIS — Z87.39 HX OF GOUT: ICD-10-CM

## 2024-10-10 DIAGNOSIS — Z12.11 SCREEN FOR COLON CANCER: ICD-10-CM

## 2024-10-10 DIAGNOSIS — E79.0 ELEVATED URIC ACID IN BLOOD: ICD-10-CM

## 2024-10-10 DIAGNOSIS — Z00.00 ENCOUNTER FOR PREVENTIVE HEALTH EXAMINATION: Primary | ICD-10-CM

## 2024-10-10 DIAGNOSIS — Z11.59 NEED FOR HEPATITIS C SCREENING TEST: ICD-10-CM

## 2024-10-10 DIAGNOSIS — M10.9 GOUTY ARTHRITIS OF LEFT GREAT TOE: ICD-10-CM

## 2024-10-10 DIAGNOSIS — E78.5 HYPERLIPIDEMIA LDL GOAL <100: ICD-10-CM

## 2024-10-10 DIAGNOSIS — Z13.220 LIPID SCREENING: ICD-10-CM

## 2024-10-10 LAB
BASOPHILS # BLD AUTO: 0 10E3/UL (ref 0–0.2)
BASOPHILS NFR BLD AUTO: 1 %
EOSINOPHIL # BLD AUTO: 0.1 10E3/UL (ref 0–0.7)
EOSINOPHIL NFR BLD AUTO: 2 %
ERYTHROCYTE [DISTWIDTH] IN BLOOD BY AUTOMATED COUNT: 12.1 % (ref 10–15)
HCT VFR BLD AUTO: 44.3 % (ref 40–53)
HGB BLD-MCNC: 15.6 G/DL (ref 13.3–17.7)
IMM GRANULOCYTES # BLD: 0 10E3/UL
IMM GRANULOCYTES NFR BLD: 0 %
LYMPHOCYTES # BLD AUTO: 1.4 10E3/UL (ref 0.8–5.3)
LYMPHOCYTES NFR BLD AUTO: 30 %
MCH RBC QN AUTO: 31 PG (ref 26.5–33)
MCHC RBC AUTO-ENTMCNC: 35.2 G/DL (ref 31.5–36.5)
MCV RBC AUTO: 88 FL (ref 78–100)
MONOCYTES # BLD AUTO: 0.6 10E3/UL (ref 0–1.3)
MONOCYTES NFR BLD AUTO: 12 %
NEUTROPHILS # BLD AUTO: 2.5 10E3/UL (ref 1.6–8.3)
NEUTROPHILS NFR BLD AUTO: 55 %
PLATELET # BLD AUTO: 233 10E3/UL (ref 150–450)
RBC # BLD AUTO: 5.03 10E6/UL (ref 4.4–5.9)
WBC # BLD AUTO: 4.6 10E3/UL (ref 4–11)

## 2024-10-10 PROCEDURE — 80061 LIPID PANEL: CPT | Performed by: NURSE PRACTITIONER

## 2024-10-10 PROCEDURE — 84550 ASSAY OF BLOOD/URIC ACID: CPT | Performed by: NURSE PRACTITIONER

## 2024-10-10 PROCEDURE — 99396 PREV VISIT EST AGE 40-64: CPT | Performed by: NURSE PRACTITIONER

## 2024-10-10 PROCEDURE — 80053 COMPREHEN METABOLIC PANEL: CPT | Performed by: NURSE PRACTITIONER

## 2024-10-10 PROCEDURE — 85025 COMPLETE CBC W/AUTO DIFF WBC: CPT | Performed by: NURSE PRACTITIONER

## 2024-10-10 PROCEDURE — 36415 COLL VENOUS BLD VENIPUNCTURE: CPT | Performed by: NURSE PRACTITIONER

## 2024-10-10 ASSESSMENT — PAIN SCALES - GENERAL: PAINLEVEL: NO PAIN (0)

## 2024-10-10 ASSESSMENT — ENCOUNTER SYMPTOMS
GASTROINTESTINAL NEGATIVE: 1
RESPIRATORY NEGATIVE: 1
CONSTITUTIONAL NEGATIVE: 1
CARDIOVASCULAR NEGATIVE: 1
NEUROLOGICAL NEGATIVE: 1

## 2024-10-10 NOTE — PROGRESS NOTES
"Preventive Care Visit  [facility]  Malissa Leach CNP, Internal Medicine  Oct 10, 2024      Assessment & Plan     Encounter for preventive health examination  Counseling: counseling provided regarding nutrition, regular exercise, general safety and periodic health exams   Declined flu shot  Colonoscopy referral placed  Labs today     - Colonoscopy Screening  Referral; Future  - Lipid panel reflex to direct LDL Non-fasting; Future  - Uric acid; Future  - Comprehensive metabolic panel (BMP + Alb, Alk Phos, ALT, AST, Total. Bili, TP); Future  - CBC with platelets and differential; Future  - Lipid panel reflex to direct LDL Non-fasting  - Comprehensive metabolic panel (BMP + Alb, Alk Phos, ALT, AST, Total. Bili, TP)  - CBC with platelets and differential    Gouty arthritis of left great toe  -check uric acid level -no problems currently     - Uric acid    Screen for colon cancer    - Colonoscopy Screening  Referral; Future    Screening for HIV (human immunodeficiency virus)      Need for hepatitis C screening test      Hx of gout    - Uric acid; Future    Lipid screening    - Lipid panel reflex to direct LDL Non-fasting; Future  - Lipid panel reflex to direct LDL Non-fasting        BMI  Estimated body mass index is 30.31 kg/m  as calculated from the following:    Height as of this encounter: 1.867 m (6' 1.5\").    Weight as of this encounter: 105.6 kg (232 lb 14.4 oz).   Weight management plan: Discussed healthy diet and exercise guidelines    Counseling  Appropriate preventive services were addressed with this patient via screening, questionnaire, or discussion as appropriate for fall prevention, nutrition, physical activity, Tobacco-use cessation, social engagement, weight loss and cognition.  Checklist reviewing preventive services available has been given to the patient.  Reviewed patient's diet, addressing concerns and/or questions.           Subjective   Chucky is a 44 year " old, presenting for the following:  Physical        10/10/2024     8:54 AM   Additional Questions   Roomed by Lidya Trinh   Accompanied by self         10/10/2024     8:54 AM   Patient Reported Additional Medications   Patient reports taking the following new medications no        Health Care Directive  Patient does not have a Health Care Directive or Living Will: Discussed advance care planning with patient; however, patient declined at this time.    FABIO Locke presents to the clinic today for annual physical.  He is feeling well.  Reports he was having problems with gout in left toe for a few months but now things seem to be getting better.  He exercises regularly and tries to eat a good diet.            10/9/2024   General Health   How would you rate your overall physical health? Good   Feel stress (tense, anxious, or unable to sleep) Very much      (!) STRESS CONCERN      10/9/2024   Nutrition   Three or more servings of calcium each day? (!) NO   Diet: Regular (no restrictions)   How many servings of fruit and vegetables per day? (!) 2-3   How many sweetened beverages each day? 0-1            10/9/2024   Exercise   Days per week of moderate/strenous exercise 6 days   Average minutes spent exercising at this level 80 min            10/9/2024   Social Factors   Frequency of gathering with friends or relatives Once a week   Worry food won't last until get money to buy more No   Food not last or not have enough money for food? No   Do you have housing? (Housing is defined as stable permanent housing and does not include staying ouside in a car, in a tent, in an abandoned building, in an overnight shelter, or couch-surfing.) Yes   Are you worried about losing your housing? No   Lack of transportation? No   Unable to get utilities (heat,electricity)? No            10/9/2024   Dental   Dentist two times every year? Yes            10/9/2024   TB Screening   Were you born outside of the [place]? No              Today's  "PHQ-2 Score:       5/7/2024    10:10 AM   PHQ-2 ( 1999 [employer])   Q1: Little interest or pleasure in doing things 0   Q2: Feeling down, depressed or hopeless 0   PHQ-2 Score 0         10/9/2024   Substance Use   Alcohol more than 3/day or more than 7/wk No   Do you use any other substances recreationally? No        Social History     Tobacco Use    Smoking status: Former     Current packs/day: 1.00     Average packs/day: 1 pack/day for 15.0 years (15.0 ttl pk-yrs)     Types: Cigarettes     Passive exposure: Past    Smokeless tobacco: Never   Vaping Use    Vaping status: Never Used   Substance Use Topics    Alcohol use: Yes     Alcohol/week: 0.0 standard drinks of alcohol     Comment: socially    Drug use: No             10/9/2024   One time HIV Screening   Previous HIV test? No          10/9/2024   STI Screening   New sexual partner(s) since last STI/HIV test? No      ASCVD Risk   The ASCVD Risk score (Hardeep GAITAN, et al., 2019) failed to calculate for the following reasons:    Cannot find a previous HDL lab    Cannot find a previous total cholesterol lab        10/9/2024   Contraception/Family Planning   Questions about contraception or family planning No           Reviewed and updated as needed this visit by Provider   Tobacco  Allergies  Meds  Problems  Med Hx  Surg Hx  Fam Hx                Review of Systems   Constitutional: Negative.    HENT: Negative.     Respiratory: Negative.     Cardiovascular: Negative.    Gastrointestinal: Negative.    Neurological: Negative.         Objective    Exam  /83 (BP Location: Left arm, Patient Position: Sitting, Cuff Size: Adult Large)   Pulse 86   Temp 97.1  F (36.2  C) (Oral)   Resp 14   Ht 1.867 m (6' 1.5\")   Wt 105.6 kg (232 lb 14.4 oz)   SpO2 97%   BMI 30.31 kg/m     Estimated body mass index is 30.31 kg/m  as calculated from the following:    Height as of this encounter: 1.867 m (6' 1.5\").    Weight as of this encounter: 105.6 kg (232 lb 14.4 " oz).    Physical Exam  Vitals and nursing note reviewed.   Constitutional:       General: He is not in acute distress.     Appearance: Normal appearance. He is not ill-appearing.   HENT:      Head: Normocephalic and atraumatic.      Right Ear: Tympanic membrane, ear canal and external ear normal.      Left Ear: Tympanic membrane, ear canal and external ear normal.      Nose: Nose normal.      Mouth/Throat:      Mouth: Mucous membranes are moist.      Pharynx: Oropharynx is clear.   Eyes:      Extraocular Movements: Extraocular movements intact.      Conjunctiva/sclera: Conjunctivae normal.      Pupils: Pupils are equal, round, and reactive to light.   Cardiovascular:      Rate and Rhythm: Normal rate and regular rhythm.      Pulses: Normal pulses.      Heart sounds: Normal heart sounds.   Pulmonary:      Effort: Pulmonary effort is normal.      Breath sounds: Normal breath sounds.   Abdominal:      General: Bowel sounds are normal. There is no distension.      Palpations: Abdomen is soft. There is no mass.      Tenderness: There is no abdominal tenderness. There is no guarding.   Musculoskeletal:         General: Normal range of motion.      Cervical back: Normal range of motion.   Skin:     General: Skin is warm and dry.   Neurological:      General: No focal deficit present.      Mental Status: He is alert and oriented to person, place, and time. Mental status is at baseline.   Psychiatric:         Mood and Affect: Mood normal.         Behavior: Behavior normal.               Signed Electronically by: Malissa Leach CNP

## 2024-10-11 LAB
ALBUMIN SERPL BCG-MCNC: 4.6 G/DL (ref 3.5–5.2)
ALP SERPL-CCNC: 87 U/L (ref 40–150)
ALT SERPL W P-5'-P-CCNC: 41 U/L (ref 0–70)
ANION GAP SERPL CALCULATED.3IONS-SCNC: 10 MMOL/L (ref 7–15)
AST SERPL W P-5'-P-CCNC: 33 U/L (ref 0–45)
BILIRUB SERPL-MCNC: 1 MG/DL
BUN SERPL-MCNC: 18 MG/DL (ref 6–20)
CALCIUM SERPL-MCNC: 9.6 MG/DL (ref 8.8–10.4)
CHLORIDE SERPL-SCNC: 103 MMOL/L (ref 98–107)
CHOLEST SERPL-MCNC: 297 MG/DL
CREAT SERPL-MCNC: 1.01 MG/DL (ref 0.67–1.17)
EGFRCR SERPLBLD CKD-EPI 2021: >90 ML/MIN/1.73M2
FASTING STATUS PATIENT QL REPORTED: YES
FASTING STATUS PATIENT QL REPORTED: YES
GLUCOSE SERPL-MCNC: 92 MG/DL (ref 70–99)
HCO3 SERPL-SCNC: 26 MMOL/L (ref 22–29)
HDLC SERPL-MCNC: 68 MG/DL
LDLC SERPL CALC-MCNC: 207 MG/DL
NONHDLC SERPL-MCNC: 229 MG/DL
POTASSIUM SERPL-SCNC: 4.7 MMOL/L (ref 3.4–5.3)
PROT SERPL-MCNC: 7.3 G/DL (ref 6.4–8.3)
SODIUM SERPL-SCNC: 139 MMOL/L (ref 135–145)
TRIGL SERPL-MCNC: 108 MG/DL
URATE SERPL-MCNC: 7.3 MG/DL (ref 3.4–7)

## 2024-10-11 RX ORDER — ALLOPURINOL 100 MG/1
100 TABLET ORAL DAILY
Qty: 90 TABLET | Refills: 3 | Status: SHIPPED | OUTPATIENT
Start: 2024-10-11

## 2024-10-14 ENCOUNTER — TELEPHONE (OUTPATIENT)
Dept: INTERNAL MEDICINE | Facility: CLINIC | Age: 44
End: 2024-10-14
Payer: COMMERCIAL

## 2024-10-14 NOTE — TELEPHONE ENCOUNTER
Patient returned call and message with results was read to him. Patient made his 6 mo repeat lab appointment as well.

## 2024-10-14 NOTE — TELEPHONE ENCOUNTER
Attempt #1 Left voicemail for patient to call clinic back and sent [employer] message.     ----- Message from Malissa Leach sent at 10/11/2024  4:02 PM CDT -----  Team - please call patient with results.  Let patient know that cholesterol came back pretty elevated, higher than it has been in the past.  His LDL or bad cholesterol ideally we would like less than 100, his sat 207, his good cholesterol is good at 68.  At this point I would recommend really working hard on diet and exercise.   he is at the point to needing medications but if he does not want to do this and try lifestyle changes first that would be fine and have him repeat this in 6 months.  His kidney function, liver enzymes and electrolytes look fine.  Blood sugar was good.  Complete blood count is normal.  His uric acid level is a little bit elevated at 7.3, at this point I think we should have him take allopurinol to lower this and prevent gout flares.

## 2024-11-07 ENCOUNTER — TELEPHONE (OUTPATIENT)
Dept: GASTROENTEROLOGY | Facility: CLINIC | Age: 44
End: 2024-11-07
Payer: COMMERCIAL

## 2024-11-07 NOTE — TELEPHONE ENCOUNTER
"Endoscopy Scheduling Screen    Have you had any respiratory illness or flu-like symptoms in the last 10 days?  No    What is your communication preference for Instructions and/or Bowel Prep?   MyChart    What insurance is in the chart?  Other:  [employer]ET[employer]    Ordering/Referring Provider: Malisas eLach CNP in [facility]     (If ordering provider performs procedure, schedule with ordering provider unless otherwise instructed. )    BMI: Estimated body mass index is 30.31 kg/m  as calculated from the following:    Height as of 10/10/24: 1.867 m (6' 1.5\").    Weight as of 10/10/24: 105.6 kg (232 lb 14.4 oz).     Sedation Ordered  moderate sedation.   If patient BMI > 50 do not schedule in [facility].    If patient BMI > 45 do not schedule at [facility].    Are you taking methadone or Suboxone?  NO, No RN review required.    Have you been diagnosed and are being treated for severe PTSD or severe anxiety?  NO, No RN review required.    Are you taking any prescription medications for pain 3 or more times per week?   NO, No RN review required.    Do you have a history of malignant hyperthermia?  No    (Females) Are you currently pregnant?   No     Have you been diagnosed or told you have pulmonary hypertension?   No    Do you have an LVAD?  No    Have you been told you have moderate to severe sleep apnea?  Yes. Do you use a CPAP? Yes. PATIENT STATES MODERATE (RN Review required for scheduling unless scheduling in Hospital.)     Have you been told you have COPD, asthma, or any other lung disease?  No    Do you have any heart conditions?  No     Have you ever had or are you waiting for an organ transplant?  No. Continue scheduling, no site restrictions.    Have you had a stroke or transient ischemic attack (TIA aka \"mini stroke\" in the last 6 months?   No    Have you been diagnosed with or been told you have cirrhosis of the liver?   No.    Are you currently on dialysis?   No    Do you need assistance transferring?   No    BMI: Estimated body mass " "index is 30.31 kg/m  as calculated from the following:    Height as of 10/10/24: 1.867 m (6' 1.5\").    Weight as of 10/10/24: 105.6 kg (232 lb 14.4 oz).     Is patients BMI > 40 and scheduling location [facility]?  No    Do you take an injectable or oral medication for weight loss or diabetes (excluding insulin)?  No    Do you take the medication Naltrexone?  No    Do you take blood thinners?  No       Prep   Are you currently on dialysis or do you have chronic kidney disease?  No    Do you have a diagnosis of diabetes?  No    Do you have a diagnosis of cystic fibrosis (CF)?  No    On a regular basis do you go 3 -5 days between bowel movements?  No    BMI > 40?  No    Preferred Pharmacy:      [facility] [id number] [place] [facility] [id number] [facility]  [address] [facility] [address]  Phone: [phone] Fax: [phone]      Final Scheduling Details     Procedure scheduled  Colonoscopy    Surgeon:  ANASTACIA     Date of procedure:  11/19     Pre-OP / PAC:   No - Not required for this site.    Location  [facility] - Per exclusion criteria.    Sedation   Moderate Sedation - Per order.      Patient Reminders:   You will receive a call from a Nurse to review instructions and health history.  This assessment must be completed prior to your procedure.  Failure to complete the Nurse assessment may result in the procedure being cancelled.      On the day of your procedure, please designate an adult(s) who can drive you home stay with you for the next 24 hours. The medicines used in the exam will make you sleepy. You will not be able to drive.      You cannot take public transportation, ride share services, or non-medical taxi service without a responsible caregiver.  Medical transport services are allowed with the requirement that a responsible caregiver will receive you at your destination.  We require that drivers and caregivers are confirmed prior to your procedure.    "

## 2024-11-07 NOTE — TELEPHONE ENCOUNTER
Pre visit planning completed.      Procedure details:    Patient scheduled for Colonoscopy on 11/19/24.     Arrival time: 1445. Procedure time 1530    Facility location: [facility]; [address] [place] [address]. Check in location: Main entrance, door #1 on the North side of the building under roundabout awning. DO NOT GO TO SURGERY/ED ENTRANCE.     Sedation type: Conscious sedation     Pre op exam needed? No.    Indication for procedure: screening       Chart review:     Electronic implanted devices? No    Recent diagnosis of diverticulitis within the last 6 weeks? No      Medication review:    Diabetic? No    Anticoagulants? No    Weight loss medication/injectable? No GLP-1 medication per patient's medication list.  RN will verify with pre-assessment call.    Other medication HOLDING recommendations:  N/A      Prep for procedure:     Bowel prep recommendation: Standard Miralax  Due to: standard bowel prep.    Prep instructions sent via [employer]         Carol Ruiz RN  Endoscopy Procedure Pre Assessment RN  [phone] option 2

## 2024-11-08 NOTE — TELEPHONE ENCOUNTER
Pre assessment completed for upcoming procedure.   (Please see previous telephone encounter notes for complete details)        Procedure details:    Arrival time and facility location reviewed.    Pre op exam needed? No.    Designated  policy reviewed. Instructed to have someone stay 6  hours post procedure.       Medication review:    Medications reviewed. Please see supporting documentation below. Holding recommendations discussed (if applicable).   N/A      Prep for procedure:     Procedure prep instructions reviewed.        Any additional information needed:  N/A      Patient  verbalized understanding and had no questions or concerns at this time.      January Bradley RN  Endoscopy Procedure Pre Assessment   [phone] option 2

## 2024-11-15 NOTE — TELEPHONE ENCOUNTER
Incoming call to confirm CLD time and times he should start prep.    Writer advised fast and CLD starts 12 a.m. on day prior to procedure (11/18/24) and his prepping starts at 4.  The patient states he has read the instructions multiple times and understands prep now.    No further questions at this time.    Corinne Kliber, RN  Endoscopy Procedure Pre Assessment RN  [phone] option 2

## 2024-11-19 ENCOUNTER — MYC MEDICAL ADVICE (OUTPATIENT)
Dept: INTERNAL MEDICINE | Facility: CLINIC | Age: 44
End: 2024-11-19

## 2024-11-19 ENCOUNTER — HOSPITAL ENCOUNTER (OUTPATIENT)
Facility: CLINIC | Age: 44
Discharge: HOME | End: 2024-11-19
Attending: INTERNAL MEDICINE | Admitting: INTERNAL MEDICINE
Payer: COMMERCIAL

## 2024-11-19 VITALS
OXYGEN SATURATION: 94 % | HEART RATE: 67 BPM | HEIGHT: 74 IN | BODY MASS INDEX: 28.11 KG/M2 | RESPIRATION RATE: 16 BRPM | WEIGHT: 219 LBS | DIASTOLIC BLOOD PRESSURE: 72 MMHG | SYSTOLIC BLOOD PRESSURE: 120 MMHG

## 2024-11-19 LAB — COLONOSCOPY: NORMAL

## 2024-11-19 PROCEDURE — G0121 COLON CA SCRN NOT HI RSK IND: HCPCS | Performed by: INTERNAL MEDICINE

## 2024-11-19 PROCEDURE — 45378 DIAGNOSTIC COLONOSCOPY: CPT | Performed by: INTERNAL MEDICINE

## 2024-11-19 PROCEDURE — 250N000011 HC RX IP 250 OP 636: Performed by: INTERNAL MEDICINE

## 2024-11-19 PROCEDURE — G0500 MOD SEDAT ENDO SERVICE >5YRS: HCPCS | Performed by: INTERNAL MEDICINE

## 2024-11-19 RX ORDER — NALOXONE HYDROCHLORIDE 0.4 MG/ML
0.4 INJECTION, SOLUTION INTRAMUSCULAR; INTRAVENOUS; SUBCUTANEOUS
Status: DISCONTINUED | OUTPATIENT
Start: 2024-11-19 | End: 2024-11-19 | Stop reason: HOSPADM

## 2024-11-19 RX ORDER — ONDANSETRON 4 MG/1
4 TABLET, ORALLY DISINTEGRATING ORAL EVERY 6 HOURS PRN
Status: DISCONTINUED | OUTPATIENT
Start: 2024-11-19 | End: 2024-11-19 | Stop reason: HOSPADM

## 2024-11-19 RX ORDER — ATROPINE SULFATE 0.1 MG/ML
1 INJECTION INTRAVENOUS
Status: DISCONTINUED | OUTPATIENT
Start: 2024-11-19 | End: 2024-11-19 | Stop reason: HOSPADM

## 2024-11-19 RX ORDER — ONDANSETRON 2 MG/ML
4 INJECTION INTRAMUSCULAR; INTRAVENOUS EVERY 6 HOURS PRN
Status: DISCONTINUED | OUTPATIENT
Start: 2024-11-19 | End: 2024-11-19 | Stop reason: HOSPADM

## 2024-11-19 RX ORDER — NALOXONE HYDROCHLORIDE 0.4 MG/ML
0.2 INJECTION, SOLUTION INTRAMUSCULAR; INTRAVENOUS; SUBCUTANEOUS
Status: DISCONTINUED | OUTPATIENT
Start: 2024-11-19 | End: 2024-11-19 | Stop reason: HOSPADM

## 2024-11-19 RX ORDER — FLUMAZENIL 0.1 MG/ML
0.2 INJECTION, SOLUTION INTRAVENOUS
Status: DISCONTINUED | OUTPATIENT
Start: 2024-11-19 | End: 2024-11-19 | Stop reason: HOSPADM

## 2024-11-19 RX ORDER — EPINEPHRINE 1 MG/ML
0.1 INJECTION, SOLUTION, CONCENTRATE INTRAVENOUS
Status: DISCONTINUED | OUTPATIENT
Start: 2024-11-19 | End: 2024-11-19 | Stop reason: HOSPADM

## 2024-11-19 RX ORDER — DIPHENHYDRAMINE HYDROCHLORIDE 50 MG/ML
25-50 INJECTION INTRAMUSCULAR; INTRAVENOUS
Status: DISCONTINUED | OUTPATIENT
Start: 2024-11-19 | End: 2024-11-19 | Stop reason: HOSPADM

## 2024-11-19 RX ORDER — SIMETHICONE 40MG/0.6ML
133 SUSPENSION, DROPS(FINAL DOSAGE FORM)(ML) ORAL
Status: DISCONTINUED | OUTPATIENT
Start: 2024-11-19 | End: 2024-11-19 | Stop reason: HOSPADM

## 2024-11-19 RX ORDER — LIDOCAINE 40 MG/G
CREAM TOPICAL
Status: DISCONTINUED | OUTPATIENT
Start: 2024-11-19 | End: 2024-11-19 | Stop reason: HOSPADM

## 2024-11-19 RX ORDER — FENTANYL CITRATE 50 UG/ML
50-100 INJECTION, SOLUTION INTRAMUSCULAR; INTRAVENOUS EVERY 5 MIN PRN
Status: DISCONTINUED | OUTPATIENT
Start: 2024-11-19 | End: 2024-11-19 | Stop reason: HOSPADM

## 2024-11-19 RX ORDER — PROCHLORPERAZINE MALEATE 10 MG
10 TABLET ORAL EVERY 6 HOURS PRN
Status: DISCONTINUED | OUTPATIENT
Start: 2024-11-19 | End: 2024-11-19 | Stop reason: HOSPADM

## 2024-11-19 RX ORDER — ONDANSETRON 2 MG/ML
4 INJECTION INTRAMUSCULAR; INTRAVENOUS
Status: DISCONTINUED | OUTPATIENT
Start: 2024-11-19 | End: 2024-11-19 | Stop reason: HOSPADM

## 2024-11-19 RX ADMIN — MIDAZOLAM 2 MG: 1 INJECTION INTRAMUSCULAR; INTRAVENOUS at 10:43

## 2024-11-19 RX ADMIN — FENTANYL CITRATE 100 MCG: 50 INJECTION, SOLUTION INTRAMUSCULAR; INTRAVENOUS at 10:43

## 2024-11-19 ASSESSMENT — ACTIVITIES OF DAILY LIVING (ADL)
ADLS_ACUITY_SCORE: 0
ADLS_ACUITY_SCORE: 0

## 2024-11-19 NOTE — H&P
Pre-Endoscopy History and Physical     Kvng Peace MRN# [id number]   YOB: 1980 Age: 44 year old     Date of Procedure: 2024  Primary care provider: No Ref-Primary, Physician  Type of Endoscopy: Colonoscopy with possible biopsy, possible polypectomy  Reason for Procedure: screen  Type of Anesthesia Anticipated: Conscious Sedation    HPI:    Kvng is a 44 year old male who will be undergoing the above procedure.      A history and physical has been performed. The patient's medications and allergies have been reviewed. The risks and benefits of the procedure and the sedation options and risks were discussed with the patient.  All questions were answered and informed consent was obtained.      He denies a personal or family history of anesthesia complications or bleeding disorders.     Patient Active Problem List   Diagnosis    Gastroesophageal reflux disease, esophagitis presence not specified    History of gout    VIVIENNE (obstructive sleep apnea)-AHI 10    Other insomnia    KIMBERLY (generalized anxiety disorder)        Past Medical History:   Diagnosis Date    CARDIOVASCULAR SCREENING; LDL GOAL LESS THAN 160 2014    Gout     Palpitations         Past Surgical History:   Procedure Laterality Date    VASOVASOSTOMY      wisdom teeth         Social History     Tobacco Use    Smoking status: Former     Current packs/day: 1.00     Average packs/day: 1 pack/day for 15.0 years (15.0 ttl pk-yrs)     Types: Cigarettes     Passive exposure: Past    Smokeless tobacco: Never   Substance Use Topics    Alcohol use: Yes     Alcohol/week: 0.0 standard drinks of alcohol     Comment: socially       Family History   Problem Relation Age of Onset    Diabetes Mother         T2    Cancer Father         kidney: 64:     Cancer Maternal Grandfather         lung    Diabetes Paternal Grandmother     Diabetes Paternal Grandfather        Prior to Admission medications    Medication Sig Start Date End Date Taking?  "Authorizing Provider   allopurinol (ZYLOPRIM) 100 MG tablet Take 1 tablet (100 mg) by mouth daily. 10/11/24   Malissa Leach, CNP       No Known Allergies     REVIEW OF SYSTEMS:   5 point ROS negative except as noted above in HPI, including Gen., Resp., CV, GI &  system review.    PHYSICAL EXAM:   There were no vitals taken for this visit. Estimated body mass index is 30.31 kg/m  as calculated from the following:    Height as of 10/10/24: 1.867 m (6' 1.5\").    Weight as of 10/10/24: 105.6 kg (232 lb 14.4 oz).   GENERAL APPEARANCE: alert, and oriented  MENTAL STATUS: alert  AIRWAY EXAM: Mallampatti Class I (visualization of the soft palate, fauces, uvula, anterior and posterior pillars)  RESP: lungs clear to auscultation - no rales, rhonchi or wheezes  CV: regular rates and rhythm  DIAGNOSTICS:    Not indicated    IMPRESSION   ASA Class 2 - Mild systemic disease    PLAN:   Plan for Colonoscopy with possible biopsy, possible polypectomy. We discussed the risks, benefits and alternatives and the patient wished to proceed.    The above has been forwarded to the consulting provider.      Signed Electronically by: Ryley Thibodeaux MD  November 19, 2024          "

## 2025-04-15 ENCOUNTER — LAB (OUTPATIENT)
Dept: LAB | Facility: CLINIC | Age: 45
End: 2025-04-15
Payer: COMMERCIAL

## 2025-04-15 DIAGNOSIS — E78.5 HYPERLIPIDEMIA LDL GOAL <100: ICD-10-CM

## 2025-04-15 LAB
CHOLEST SERPL-MCNC: 236 MG/DL
FASTING STATUS PATIENT QL REPORTED: YES
HDLC SERPL-MCNC: 66 MG/DL
LDLC SERPL CALC-MCNC: 148 MG/DL
NONHDLC SERPL-MCNC: 170 MG/DL
TRIGL SERPL-MCNC: 108 MG/DL

## 2025-04-15 PROCEDURE — 80061 LIPID PANEL: CPT

## 2025-04-15 PROCEDURE — 36415 COLL VENOUS BLD VENIPUNCTURE: CPT

## 2025-04-24 ASSESSMENT — SLEEP AND FATIGUE QUESTIONNAIRES
HOW LIKELY ARE YOU TO NOD OFF OR FALL ASLEEP WHILE SITTING AND READING: WOULD NEVER DOZE
HOW LIKELY ARE YOU TO NOD OFF OR FALL ASLEEP WHILE LYING DOWN TO REST IN THE AFTERNOON WHEN CIRCUMSTANCES PERMIT: MODERATE CHANCE OF DOZING
HOW LIKELY ARE YOU TO NOD OFF OR FALL ASLEEP WHILE WATCHING TV: WOULD NEVER DOZE
HOW LIKELY ARE YOU TO NOD OFF OR FALL ASLEEP IN A CAR, WHILE STOPPED FOR A FEW MINUTES IN TRAFFIC: WOULD NEVER DOZE
HOW LIKELY ARE YOU TO NOD OFF OR FALL ASLEEP WHILE SITTING QUIETLY AFTER LUNCH WITHOUT ALCOHOL: WOULD NEVER DOZE
HOW LIKELY ARE YOU TO NOD OFF OR FALL ASLEEP WHEN YOU ARE A PASSENGER IN A CAR FOR AN HOUR WITHOUT A BREAK: WOULD NEVER DOZE
HOW LIKELY ARE YOU TO NOD OFF OR FALL ASLEEP WHILE SITTING AND TALKING TO SOMEONE: WOULD NEVER DOZE
HOW LIKELY ARE YOU TO NOD OFF OR FALL ASLEEP WHILE SITTING INACTIVE IN A PUBLIC PLACE: WOULD NEVER DOZE

## 2025-04-29 ENCOUNTER — VIRTUAL VISIT (OUTPATIENT)
Dept: SLEEP MEDICINE | Facility: CLINIC | Age: 45
End: 2025-04-29
Payer: COMMERCIAL

## 2025-04-29 VITALS — HEIGHT: 74 IN | BODY MASS INDEX: 28.23 KG/M2 | WEIGHT: 220 LBS

## 2025-04-29 DIAGNOSIS — G47.33 OSA (OBSTRUCTIVE SLEEP APNEA): Primary | ICD-10-CM

## 2025-04-29 PROCEDURE — 98006 SYNCH AUDIO-VIDEO EST MOD 30: CPT | Performed by: PHYSICIAN ASSISTANT

## 2025-04-29 ASSESSMENT — PAIN SCALES - GENERAL: PAINLEVEL_OUTOF10: NO PAIN (0)

## 2025-04-29 NOTE — NURSING NOTE
Current patient location: [id number] [facility] [address]    Is the patient currently in the state of [place]? YES    Visit mode: VIDEO    If the visit is dropped, the patient can be reconnected by:VIDEO VISIT: Text to cell phone:   Telephone Information:   Mobile [phone]       Will anyone else be joining the visit? NO  (If patient encounters technical issues they should call [phone]    Are changes needed to the allergy or medication list? Pt stated no changes to allergies and Pt stated no med changes    Are refills needed on medications prescribed by this physician? NO    Rooming Documentation:  Questionnaire(s) completed    Reason for visit: PROSPER PEREZF

## 2025-04-29 NOTE — PROGRESS NOTES
Virtual Visit Details    Type of service:  Video Visit     Originating Location (pt. Location): Home    Distant Location (provider location):  On-site  Platform used for Video Visit: [facility]    Sleep Apnea - Follow-up Visit:    Impression/Plan:  Mild obstructive sleep apnea with sleep related hypoxemia. Tolerating PAP well. Daytime symptoms are improved.   Patient counseled to use CPAP with all sleep.  Sleep apnea reviewed. Also reviewed potential consequences of untreated sleep apnea   Comprehensive DME order placed.       Kvng Peace will follow up in about 1 year    Mane Lyman PA-C  Sleep Medicine       History of Present Illness:  Chief Complaint   Patient presents with    RECHECK    CPAP Follow Up       Kvng Peace presents for follow-up of their mild sleep apnea, managed with CPAP.     He initially presented with snoring, witnessed apnea and daytime fatigue. Home sleep apnea testing recommended.    Home Sleep Apnea Testing - 10/05/2022: 225 lbs 0 oz:  Respiration:   Sleep Associated Hypoxemia: sustained hypoxemia was present. Baseline oxygen saturation was 95%.  Time with saturation less than or equal to 88% was 13.9 minutes. The lowest oxygen saturation was 76%.   Snoring: Snoring was present. Snoring was reported as mild, moderate and loud.  Respiratory events: The home study revealed a presence of 24 obstructive apneas and 0 mixed and central apneas. There were 50 hypopneas resulting in a combined apnea/hypopnea index [AHI] of 10.1 events per hour.  AHI was 24.1 per hour supine, N/A per hour prone, 7 per hour on left side, and 4 per hour on right side.    Elected treatment with oral appliance therapy, but did not follow up on that.    May 17, 2024. Patient received a Resmed Airsense 10 Pressures were set at  6-16 cm H2O.    Do you use a CPAP Machine at home: (Patient-Rptd) Yes  Overall, on a scale of 0-10 how would you rate your CPAP (0 poor, 10 great): (Patient-Rptd) 3    What type of mask do  you use: (Patient-Rptd) Full Face Mask  Is your mask comfortable: (Patient-Rptd) No  If not, why: (Patient-Rptd) Sometimes it breaks the seal causing a whistle.  It has to be tight then, causing discomfort.  I take it off sometimes in the middle of the night without knowing it.    Is your mask leaking: (Patient-Rptd) Yes  If yes, where do you feel it: (Patient-Rptd) Below bottom lip  How many night per week does the mask leak (0-7): (Patient-Rptd) 2/3    Do you notice snoring with mask on: (Patient-Rptd) No  Do you notice gasping arousals with mask on: (Patient-Rptd) Yes  Are you having significant oral or nasal dryness: (Patient-Rptd) No  Is the pressure setting comfortable: (Patient-Rptd) Yes  If not, why:      What is your typical bedtime: (Patient-Rptd) 9:30-10PM  How long does it take you to go to sleep on PAP therapy: (Patient-Rptd) Depends not too long generally  What time do you typically get out of bed for the day: (Patient-Rptd) 4AM  How many hours on average per night are you using PAP therapy: (Patient-Rptd) 5-6  How many hours are you sleeping per night: (Patient-Rptd) 6  Do you feel well rested in the morning: (Patient-Rptd) No      ResMed   Auto-PAP 6.0 - 16.0 cmH2O 30 day usage data:    36% of days with > 4 hours of use. 13/30 days with no use.   Average use 147 minutes per day.   95%ile Leak 23.79 L/min.   CPAP 95% pressure 11.2 cm.   AHI 2.69 events per hour.     Current problems with PAP use include:  1. Unconsciously removing mask  2. Mask discomfort  3. Falling asleep before putting it on    EPWORTH SLEEPINESS SCALE         4/24/2025     9:31 AM    Williams Sleepiness Scale ( ADELINE Bhandari  4749-9497<br>ESS - [place]/English - Final version - 21 Nov 07 - [facility] [place].)   Sitting and reading Would never doze   Watching TV Would never doze   Sitting, inactive in a public place (e.g. a theatre or a meeting) Would never doze   As a passenger in a car for an hour without a break Would never doze    Lying down to rest in the afternoon when circumstances permit Moderate chance of dozing   Sitting and talking to someone Would never doze   Sitting quietly after a lunch without alcohol Would never doze   In a car, while stopped for a few minutes in traffic Would never doze   [place] Score (MC) 2   [place] Score (Sleep) 2        Patient-reported       INSOMNIA SEVERITY INDEX (GEOFFREY)          4/24/2025     9:26 AM   Insomnia Severity Index (GEOFFREY)   Difficulty falling asleep 1   Difficulty staying asleep 2   Problems waking up too early 2   How SATISFIED/DISSATISFIED are you with your CURRENT sleep pattern? 1   How NOTICEABLE to others do you think your sleep problem is in terms of impairing the quality of your life? 0   How WORRIED/DISTRESSED are you about your current sleep problem? 0   To what extent do you consider your sleep problem to INTERFERE with your daily functioning (e.g. daytime fatigue, mood, ability to function at work/daily chores, concentration, memory, mood, etc.) CURRENTLY? 1   GEOFFREY Total Score 7        Patient-reported       Guidelines for Scoring/Interpretation:  Total score categories:  0-7 = No clinically significant insomnia   8-14 = Subthreshold insomnia   15-21 = Clinical insomnia (moderate severity)  22-28 = Clinical insomnia (severe)  Used via courtesy of www.[employer].va.gov with permission from Arturo Upton PhD., [facility]        Past medical/surgical history, family history, social history, medications and allergies were reviewed.        Problem List:  Patient Active Problem List    Diagnosis Date Noted    VIVIENNE (obstructive sleep apnea)-AHI 10 05/06/2024     Priority: Medium    Other insomnia 05/06/2024     Priority: Medium    KIMBERLY (generalized anxiety disorder) 05/06/2024     Priority: Medium    History of gout 11/26/2021     Priority: Medium    Gastroesophageal reflux disease, esophagitis presence not specified 07/08/2019     Priority: Medium     IMO Regulatory Load OCT 2020       "    Ht 1.88 m (6' 2\")   Wt 99.8 kg (220 lb)   BMI 28.25 kg/m     "

## (undated) DEVICE — KIT ENDO TURNOVER/PROCEDURE W/CLEAN A SCOPE LINERS 103888

## (undated) RX ORDER — FENTANYL CITRATE 50 UG/ML
INJECTION, SOLUTION INTRAMUSCULAR; INTRAVENOUS
Status: DISPENSED
Start: 2024-11-19